# Patient Record
Sex: FEMALE | Race: ASIAN | NOT HISPANIC OR LATINO | Employment: OTHER | URBAN - METROPOLITAN AREA
[De-identification: names, ages, dates, MRNs, and addresses within clinical notes are randomized per-mention and may not be internally consistent; named-entity substitution may affect disease eponyms.]

---

## 2024-08-19 ENCOUNTER — VIRTUAL VISIT (OUTPATIENT)
Dept: INTERPRETER SERVICES | Facility: CLINIC | Age: 64
End: 2024-08-19

## 2024-08-19 ENCOUNTER — APPOINTMENT (OUTPATIENT)
Dept: PHYSICAL THERAPY | Facility: CLINIC | Age: 64
End: 2024-08-19
Attending: FAMILY MEDICINE
Payer: COMMERCIAL

## 2024-08-19 ENCOUNTER — HOSPITAL ENCOUNTER (INPATIENT)
Facility: CLINIC | Age: 64
LOS: 1 days | Discharge: HOME HEALTH | End: 2024-08-20
Attending: EMERGENCY MEDICINE | Admitting: FAMILY MEDICINE
Payer: COMMERCIAL

## 2024-08-19 ENCOUNTER — ORDERS ONLY (AUTO-RELEASED) (OUTPATIENT)
Dept: INTENSIVE CARE | Facility: CLINIC | Age: 64
End: 2024-08-19
Payer: COMMERCIAL

## 2024-08-19 ENCOUNTER — APPOINTMENT (OUTPATIENT)
Dept: MRI IMAGING | Facility: CLINIC | Age: 64
End: 2024-08-19
Attending: EMERGENCY MEDICINE
Payer: COMMERCIAL

## 2024-08-19 ENCOUNTER — APPOINTMENT (OUTPATIENT)
Dept: CARDIOLOGY | Facility: CLINIC | Age: 64
End: 2024-08-19
Attending: FAMILY MEDICINE
Payer: COMMERCIAL

## 2024-08-19 ENCOUNTER — APPOINTMENT (OUTPATIENT)
Dept: SPEECH THERAPY | Facility: CLINIC | Age: 64
End: 2024-08-19
Attending: FAMILY MEDICINE
Payer: COMMERCIAL

## 2024-08-19 DIAGNOSIS — E87.1 HYPONATREMIA: ICD-10-CM

## 2024-08-19 DIAGNOSIS — I63.9 CEREBROVASCULAR ACCIDENT (CVA), UNSPECIFIED MECHANISM (H): Primary | ICD-10-CM

## 2024-08-19 DIAGNOSIS — R41.89 COGNITIVE IMPAIRMENT: ICD-10-CM

## 2024-08-19 DIAGNOSIS — E83.42 HYPOMAGNESEMIA: ICD-10-CM

## 2024-08-19 DIAGNOSIS — I63.9 CEREBROVASCULAR ACCIDENT (CVA), UNSPECIFIED MECHANISM (H): ICD-10-CM

## 2024-08-19 DIAGNOSIS — R42 DISEQUILIBRIUM: ICD-10-CM

## 2024-08-19 DIAGNOSIS — I10 ESSENTIAL HYPERTENSION, BENIGN: ICD-10-CM

## 2024-08-19 DIAGNOSIS — R63.8 DIFFICULTY EATING: ICD-10-CM

## 2024-08-19 LAB
ALBUMIN UR-MCNC: NEGATIVE MG/DL
ANION GAP SERPL CALCULATED.3IONS-SCNC: 12 MMOL/L (ref 7–15)
ANION GAP SERPL CALCULATED.3IONS-SCNC: 16 MMOL/L (ref 7–15)
ANION GAP SERPL CALCULATED.3IONS-SCNC: 16 MMOL/L (ref 7–15)
APPEARANCE UR: CLEAR
ATRIAL RATE - MUSE: 69 BPM
BACTERIA #/AREA URNS HPF: ABNORMAL /HPF
BASOPHILS # BLD AUTO: 0 10E3/UL (ref 0–0.2)
BASOPHILS NFR BLD AUTO: 0 %
BILIRUB UR QL STRIP: NEGATIVE
BUN SERPL-MCNC: 18.9 MG/DL (ref 8–23)
BUN SERPL-MCNC: 20.6 MG/DL (ref 8–23)
BUN SERPL-MCNC: 23.6 MG/DL (ref 8–23)
CALCIUM SERPL-MCNC: 8.1 MG/DL (ref 8.8–10.4)
CALCIUM SERPL-MCNC: 8.6 MG/DL (ref 8.8–10.4)
CALCIUM SERPL-MCNC: 8.9 MG/DL (ref 8.8–10.4)
CHLORIDE SERPL-SCNC: 81 MMOL/L (ref 98–107)
CHLORIDE SERPL-SCNC: 84 MMOL/L (ref 98–107)
CHLORIDE SERPL-SCNC: 85 MMOL/L (ref 98–107)
CHOLEST SERPL-MCNC: 111 MG/DL
COLOR UR AUTO: COLORLESS
CORTIS SERPL-MCNC: 16 UG/DL
CREAT SERPL-MCNC: 1.41 MG/DL (ref 0.51–0.95)
CREAT SERPL-MCNC: 1.42 MG/DL (ref 0.51–0.95)
CREAT SERPL-MCNC: 1.72 MG/DL (ref 0.51–0.95)
DIASTOLIC BLOOD PRESSURE - MUSE: NORMAL MMHG
EGFRCR SERPLBLD CKD-EPI 2021: 33 ML/MIN/1.73M2
EGFRCR SERPLBLD CKD-EPI 2021: 41 ML/MIN/1.73M2
EGFRCR SERPLBLD CKD-EPI 2021: 42 ML/MIN/1.73M2
EOSINOPHIL # BLD AUTO: 0.2 10E3/UL (ref 0–0.7)
EOSINOPHIL NFR BLD AUTO: 2 %
ERYTHROCYTE [DISTWIDTH] IN BLOOD BY AUTOMATED COUNT: 12 % (ref 10–15)
GLUCOSE BLDC GLUCOMTR-MCNC: 216 MG/DL (ref 70–99)
GLUCOSE BLDC GLUCOMTR-MCNC: 227 MG/DL (ref 70–99)
GLUCOSE BLDC GLUCOMTR-MCNC: 238 MG/DL (ref 70–99)
GLUCOSE BLDC GLUCOMTR-MCNC: 73 MG/DL (ref 70–99)
GLUCOSE BLDC GLUCOMTR-MCNC: 76 MG/DL (ref 70–99)
GLUCOSE SERPL-MCNC: 153 MG/DL (ref 70–99)
GLUCOSE SERPL-MCNC: 68 MG/DL (ref 70–99)
GLUCOSE SERPL-MCNC: 85 MG/DL (ref 70–99)
GLUCOSE UR STRIP-MCNC: NEGATIVE MG/DL
HBA1C MFR BLD: 9.9 %
HCO3 SERPL-SCNC: 21 MMOL/L (ref 22–29)
HCO3 SERPL-SCNC: 23 MMOL/L (ref 22–29)
HCO3 SERPL-SCNC: 30 MMOL/L (ref 22–29)
HCT VFR BLD AUTO: 31.9 % (ref 35–47)
HDLC SERPL-MCNC: 34 MG/DL
HGB BLD-MCNC: 11.4 G/DL (ref 11.7–15.7)
HGB UR QL STRIP: NEGATIVE
IMM GRANULOCYTES # BLD: 0 10E3/UL
IMM GRANULOCYTES NFR BLD: 0 %
INTERPRETATION ECG - MUSE: NORMAL
KETONES UR STRIP-MCNC: NEGATIVE MG/DL
LDLC SERPL CALC-MCNC: 51 MG/DL
LEUKOCYTE ESTERASE UR QL STRIP: NEGATIVE
LVEF ECHO: NORMAL
LYMPHOCYTES # BLD AUTO: 2.9 10E3/UL (ref 0.8–5.3)
LYMPHOCYTES NFR BLD AUTO: 32 %
MAGNESIUM SERPL-MCNC: 1.6 MG/DL (ref 1.7–2.3)
MAGNESIUM SERPL-MCNC: 2.5 MG/DL (ref 1.7–2.3)
MCH RBC QN AUTO: 29.8 PG (ref 26.5–33)
MCHC RBC AUTO-ENTMCNC: 35.7 G/DL (ref 31.5–36.5)
MCV RBC AUTO: 83 FL (ref 78–100)
MONOCYTES # BLD AUTO: 0.7 10E3/UL (ref 0–1.3)
MONOCYTES NFR BLD AUTO: 8 %
MUCOUS THREADS #/AREA URNS LPF: PRESENT /LPF
NEUTROPHILS # BLD AUTO: 5.1 10E3/UL (ref 1.6–8.3)
NEUTROPHILS NFR BLD AUTO: 57 %
NITRATE UR QL: NEGATIVE
NONHDLC SERPL-MCNC: 77 MG/DL
NRBC # BLD AUTO: 0 10E3/UL
NRBC BLD AUTO-RTO: 0 /100
OSMOLALITY SERPL: 258 MMOL/KG (ref 280–301)
OSMOLALITY UR: 151 MMOL/KG (ref 100–1200)
P AXIS - MUSE: 45 DEGREES
PH UR STRIP: 5 [PH] (ref 5–7)
PLATELET # BLD AUTO: 165 10E3/UL (ref 150–450)
POTASSIUM SERPL-SCNC: 2.9 MMOL/L (ref 3.4–5.3)
POTASSIUM SERPL-SCNC: 2.9 MMOL/L (ref 3.4–5.3)
POTASSIUM SERPL-SCNC: 3.6 MMOL/L (ref 3.4–5.3)
POTASSIUM SERPL-SCNC: 4.5 MMOL/L (ref 3.4–5.3)
PR INTERVAL - MUSE: 216 MS
QRS DURATION - MUSE: 102 MS
QT - MUSE: 440 MS
QTC - MUSE: 471 MS
R AXIS - MUSE: 22 DEGREES
RBC # BLD AUTO: 3.83 10E6/UL (ref 3.8–5.2)
RBC URINE: <1 /HPF
SODIUM SERPL-SCNC: 122 MMOL/L (ref 135–145)
SODIUM SERPL-SCNC: 122 MMOL/L (ref 135–145)
SODIUM SERPL-SCNC: 123 MMOL/L (ref 135–145)
SODIUM SERPL-SCNC: 123 MMOL/L (ref 135–145)
SODIUM SERPL-SCNC: 126 MMOL/L (ref 135–145)
SODIUM UR-SCNC: 29 MMOL/L
SP GR UR STRIP: 1 (ref 1–1.03)
SQUAMOUS EPITHELIAL: <1 /HPF
SYSTOLIC BLOOD PRESSURE - MUSE: NORMAL MMHG
T AXIS - MUSE: 78 DEGREES
T4 FREE SERPL-MCNC: 1.52 NG/DL (ref 0.9–1.7)
TRIGL SERPL-MCNC: 132 MG/DL
TROPONIN T SERPL HS-MCNC: 16 NG/L
TROPONIN T SERPL HS-MCNC: 17 NG/L
TROPONIN T SERPL HS-MCNC: 23 NG/L
TROPONIN T SERPL HS-MCNC: 29 NG/L
TSH SERPL DL<=0.005 MIU/L-ACNC: 4.8 UIU/ML (ref 0.3–4.2)
UROBILINOGEN UR STRIP-MCNC: <2 MG/DL
VENTRICULAR RATE- MUSE: 69 BPM
WBC # BLD AUTO: 8.9 10E3/UL (ref 4–11)
WBC URINE: 1 /HPF

## 2024-08-19 PROCEDURE — 250N000011 HC RX IP 250 OP 636: Performed by: INTERNAL MEDICINE

## 2024-08-19 PROCEDURE — T1013 SIGN LANG/ORAL INTERPRETER: HCPCS | Mod: U4,TEL,95 | Performed by: INTERPRETER

## 2024-08-19 PROCEDURE — 99207 PR APP CREDIT; MD BILLING SHARED VISIT: CPT | Performed by: INTERNAL MEDICINE

## 2024-08-19 PROCEDURE — 83735 ASSAY OF MAGNESIUM: CPT | Performed by: FAMILY MEDICINE

## 2024-08-19 PROCEDURE — 84439 ASSAY OF FREE THYROXINE: CPT | Performed by: FAMILY MEDICINE

## 2024-08-19 PROCEDURE — 84132 ASSAY OF SERUM POTASSIUM: CPT | Performed by: INTERNAL MEDICINE

## 2024-08-19 PROCEDURE — 255N000002 HC RX 255 OP 636: Performed by: EMERGENCY MEDICINE

## 2024-08-19 PROCEDURE — 82565 ASSAY OF CREATININE: CPT | Performed by: FAMILY MEDICINE

## 2024-08-19 PROCEDURE — 258N000003 HC RX IP 258 OP 636: Performed by: EMERGENCY MEDICINE

## 2024-08-19 PROCEDURE — 82962 GLUCOSE BLOOD TEST: CPT

## 2024-08-19 PROCEDURE — 36415 COLL VENOUS BLD VENIPUNCTURE: CPT | Performed by: INTERNAL MEDICINE

## 2024-08-19 PROCEDURE — 92610 EVALUATE SWALLOWING FUNCTION: CPT | Mod: GN

## 2024-08-19 PROCEDURE — 250N000013 HC RX MED GY IP 250 OP 250 PS 637: Performed by: EMERGENCY MEDICINE

## 2024-08-19 PROCEDURE — 83935 ASSAY OF URINE OSMOLALITY: CPT | Performed by: FAMILY MEDICINE

## 2024-08-19 PROCEDURE — 84443 ASSAY THYROID STIM HORMONE: CPT | Performed by: FAMILY MEDICINE

## 2024-08-19 PROCEDURE — 93005 ELECTROCARDIOGRAM TRACING: CPT | Performed by: EMERGENCY MEDICINE

## 2024-08-19 PROCEDURE — 96365 THER/PROPH/DIAG IV INF INIT: CPT | Mod: 59

## 2024-08-19 PROCEDURE — 83036 HEMOGLOBIN GLYCOSYLATED A1C: CPT | Performed by: FAMILY MEDICINE

## 2024-08-19 PROCEDURE — 999N000111 HC STATISTIC OT IP EVAL DEFER

## 2024-08-19 PROCEDURE — 80048 BASIC METABOLIC PNL TOTAL CA: CPT | Performed by: EMERGENCY MEDICINE

## 2024-08-19 PROCEDURE — 70549 MR ANGIOGRAPH NECK W/O&W/DYE: CPT

## 2024-08-19 PROCEDURE — 99285 EMERGENCY DEPT VISIT HI MDM: CPT | Mod: 25

## 2024-08-19 PROCEDURE — 120N000004 HC R&B MS OVERFLOW

## 2024-08-19 PROCEDURE — 80048 BASIC METABOLIC PNL TOTAL CA: CPT | Performed by: FAMILY MEDICINE

## 2024-08-19 PROCEDURE — 93306 TTE W/DOPPLER COMPLETE: CPT | Mod: 26 | Performed by: INTERNAL MEDICINE

## 2024-08-19 PROCEDURE — 97116 GAIT TRAINING THERAPY: CPT | Mod: GP

## 2024-08-19 PROCEDURE — 82533 TOTAL CORTISOL: CPT | Performed by: FAMILY MEDICINE

## 2024-08-19 PROCEDURE — 84300 ASSAY OF URINE SODIUM: CPT | Performed by: FAMILY MEDICINE

## 2024-08-19 PROCEDURE — 70553 MRI BRAIN STEM W/O & W/DYE: CPT

## 2024-08-19 PROCEDURE — 97161 PT EVAL LOW COMPLEX 20 MIN: CPT | Mod: GP

## 2024-08-19 PROCEDURE — 36415 COLL VENOUS BLD VENIPUNCTURE: CPT | Performed by: FAMILY MEDICINE

## 2024-08-19 PROCEDURE — 85025 COMPLETE CBC W/AUTO DIFF WBC: CPT | Performed by: EMERGENCY MEDICINE

## 2024-08-19 PROCEDURE — 250N000013 HC RX MED GY IP 250 OP 250 PS 637: Performed by: FAMILY MEDICINE

## 2024-08-19 PROCEDURE — 83718 ASSAY OF LIPOPROTEIN: CPT | Performed by: FAMILY MEDICINE

## 2024-08-19 PROCEDURE — 83930 ASSAY OF BLOOD OSMOLALITY: CPT | Performed by: FAMILY MEDICINE

## 2024-08-19 PROCEDURE — 36415 COLL VENOUS BLD VENIPUNCTURE: CPT | Performed by: EMERGENCY MEDICINE

## 2024-08-19 PROCEDURE — 83735 ASSAY OF MAGNESIUM: CPT | Performed by: EMERGENCY MEDICINE

## 2024-08-19 PROCEDURE — 97110 THERAPEUTIC EXERCISES: CPT | Mod: GP

## 2024-08-19 PROCEDURE — 258N000003 HC RX IP 258 OP 636: Performed by: FAMILY MEDICINE

## 2024-08-19 PROCEDURE — 250N000011 HC RX IP 250 OP 636: Performed by: EMERGENCY MEDICINE

## 2024-08-19 PROCEDURE — 82374 ASSAY BLOOD CARBON DIOXIDE: CPT | Performed by: FAMILY MEDICINE

## 2024-08-19 PROCEDURE — 250N000013 HC RX MED GY IP 250 OP 250 PS 637: Performed by: PHYSICIAN ASSISTANT

## 2024-08-19 PROCEDURE — 255N000002 HC RX 255 OP 636: Performed by: INTERNAL MEDICINE

## 2024-08-19 PROCEDURE — 70544 MR ANGIOGRAPHY HEAD W/O DYE: CPT

## 2024-08-19 PROCEDURE — 99222 1ST HOSP IP/OBS MODERATE 55: CPT | Performed by: FAMILY MEDICINE

## 2024-08-19 PROCEDURE — A9585 GADOBUTROL INJECTION: HCPCS | Performed by: EMERGENCY MEDICINE

## 2024-08-19 PROCEDURE — 96375 TX/PRO/DX INJ NEW DRUG ADDON: CPT

## 2024-08-19 PROCEDURE — 84295 ASSAY OF SERUM SODIUM: CPT | Performed by: INTERNAL MEDICINE

## 2024-08-19 PROCEDURE — 96366 THER/PROPH/DIAG IV INF ADDON: CPT

## 2024-08-19 PROCEDURE — 84484 ASSAY OF TROPONIN QUANT: CPT | Performed by: FAMILY MEDICINE

## 2024-08-19 PROCEDURE — 84484 ASSAY OF TROPONIN QUANT: CPT | Performed by: EMERGENCY MEDICINE

## 2024-08-19 PROCEDURE — 250N000013 HC RX MED GY IP 250 OP 250 PS 637: Performed by: INTERNAL MEDICINE

## 2024-08-19 PROCEDURE — 81001 URINALYSIS AUTO W/SCOPE: CPT | Performed by: EMERGENCY MEDICINE

## 2024-08-19 RX ORDER — LIDOCAINE 40 MG/G
CREAM TOPICAL
Status: DISCONTINUED | OUTPATIENT
Start: 2024-08-19 | End: 2024-08-20 | Stop reason: HOSPADM

## 2024-08-19 RX ORDER — ASPIRIN 300 MG/1
300 SUPPOSITORY RECTAL DAILY
Status: DISCONTINUED | OUTPATIENT
Start: 2024-08-19 | End: 2024-08-19

## 2024-08-19 RX ORDER — NICOTINE POLACRILEX 4 MG
15-30 LOZENGE BUCCAL
Status: DISCONTINUED | OUTPATIENT
Start: 2024-08-19 | End: 2024-08-20 | Stop reason: HOSPADM

## 2024-08-19 RX ORDER — FAMOTIDINE 20 MG/1
20 TABLET, FILM COATED ORAL 2 TIMES DAILY
Status: DISCONTINUED | OUTPATIENT
Start: 2024-08-19 | End: 2024-08-20 | Stop reason: HOSPADM

## 2024-08-19 RX ORDER — MAGNESIUM SULFATE HEPTAHYDRATE 40 MG/ML
2 INJECTION, SOLUTION INTRAVENOUS ONCE
Status: COMPLETED | OUTPATIENT
Start: 2024-08-19 | End: 2024-08-19

## 2024-08-19 RX ORDER — SODIUM CHLORIDE 9 MG/ML
INJECTION, SOLUTION INTRAVENOUS CONTINUOUS
Status: DISCONTINUED | OUTPATIENT
Start: 2024-08-19 | End: 2024-08-20

## 2024-08-19 RX ORDER — CLOPIDOGREL BISULFATE 75 MG/1
300 TABLET ORAL ONCE
Status: DISCONTINUED | OUTPATIENT
Start: 2024-08-19 | End: 2024-08-19

## 2024-08-19 RX ORDER — HYDROMORPHONE HCL IN WATER/PF 6 MG/30 ML
0.2 PATIENT CONTROLLED ANALGESIA SYRINGE INTRAVENOUS
Status: COMPLETED | OUTPATIENT
Start: 2024-08-19 | End: 2024-08-19

## 2024-08-19 RX ORDER — ASPIRIN 81 MG/1
81 TABLET ORAL DAILY
Status: DISCONTINUED | OUTPATIENT
Start: 2024-08-20 | End: 2024-08-19

## 2024-08-19 RX ORDER — GADOBUTROL 604.72 MG/ML
5 INJECTION INTRAVENOUS ONCE
Status: COMPLETED | OUTPATIENT
Start: 2024-08-19 | End: 2024-08-19

## 2024-08-19 RX ORDER — ASPIRIN 81 MG/1
81 TABLET ORAL DAILY
Status: DISCONTINUED | OUTPATIENT
Start: 2024-08-20 | End: 2024-08-20 | Stop reason: HOSPADM

## 2024-08-19 RX ORDER — MECLIZINE HCL 12.5 MG 12.5 MG/1
12.5 TABLET ORAL ONCE
Status: COMPLETED | OUTPATIENT
Start: 2024-08-19 | End: 2024-08-19

## 2024-08-19 RX ORDER — ASPIRIN 325 MG
325 TABLET, DELAYED RELEASE (ENTERIC COATED) ORAL DAILY
Status: DISCONTINUED | OUTPATIENT
Start: 2024-08-19 | End: 2024-08-19

## 2024-08-19 RX ORDER — ASPIRIN 300 MG/1
300 SUPPOSITORY RECTAL ONCE
Status: COMPLETED | OUTPATIENT
Start: 2024-08-19 | End: 2024-08-19

## 2024-08-19 RX ORDER — ATORVASTATIN CALCIUM 40 MG/1
40 TABLET, FILM COATED ORAL DAILY
COMMUNITY
Start: 2024-07-12

## 2024-08-19 RX ORDER — CLOPIDOGREL BISULFATE 75 MG/1
75 TABLET ORAL DAILY
Status: DISCONTINUED | OUTPATIENT
Start: 2024-08-20 | End: 2024-08-19

## 2024-08-19 RX ORDER — DEXTROSE MONOHYDRATE 25 G/50ML
25-50 INJECTION, SOLUTION INTRAVENOUS
Status: DISCONTINUED | OUTPATIENT
Start: 2024-08-19 | End: 2024-08-20 | Stop reason: HOSPADM

## 2024-08-19 RX ORDER — ASPIRIN 81 MG/1
81 TABLET ORAL DAILY
COMMUNITY
Start: 2024-07-12

## 2024-08-19 RX ORDER — CARBOXYMETHYLCELLULOSE SODIUM AND GLYCERIN 5; 9 MG/ML; MG/ML
1 SOLUTION/ DROPS OPHTHALMIC 3 TIMES DAILY PRN
COMMUNITY
Start: 2024-07-31

## 2024-08-19 RX ORDER — ACETAMINOPHEN 650 MG/1
650 SUPPOSITORY RECTAL EVERY 4 HOURS PRN
Status: DISCONTINUED | OUTPATIENT
Start: 2024-08-19 | End: 2024-08-20 | Stop reason: HOSPADM

## 2024-08-19 RX ORDER — ONDANSETRON 2 MG/ML
4 INJECTION INTRAMUSCULAR; INTRAVENOUS EVERY 6 HOURS PRN
Status: DISCONTINUED | OUTPATIENT
Start: 2024-08-19 | End: 2024-08-20 | Stop reason: HOSPADM

## 2024-08-19 RX ORDER — POTASSIUM CHLORIDE 7.45 MG/ML
10 INJECTION INTRAVENOUS
Status: DISCONTINUED | OUTPATIENT
Start: 2024-08-19 | End: 2024-08-19

## 2024-08-19 RX ORDER — ONDANSETRON 2 MG/ML
4 INJECTION INTRAMUSCULAR; INTRAVENOUS EVERY 30 MIN PRN
Status: DISCONTINUED | OUTPATIENT
Start: 2024-08-19 | End: 2024-08-20 | Stop reason: HOSPADM

## 2024-08-19 RX ORDER — ONDANSETRON 4 MG/1
4 TABLET, ORALLY DISINTEGRATING ORAL EVERY 6 HOURS PRN
Status: DISCONTINUED | OUTPATIENT
Start: 2024-08-19 | End: 2024-08-20 | Stop reason: HOSPADM

## 2024-08-19 RX ORDER — AMOXICILLIN 250 MG
2 CAPSULE ORAL 2 TIMES DAILY PRN
Status: DISCONTINUED | OUTPATIENT
Start: 2024-08-19 | End: 2024-08-20 | Stop reason: HOSPADM

## 2024-08-19 RX ORDER — AMOXICILLIN 250 MG
1 CAPSULE ORAL 2 TIMES DAILY PRN
Status: DISCONTINUED | OUTPATIENT
Start: 2024-08-19 | End: 2024-08-20 | Stop reason: HOSPADM

## 2024-08-19 RX ORDER — ACETAMINOPHEN 325 MG/1
650 TABLET ORAL EVERY 4 HOURS PRN
Status: DISCONTINUED | OUTPATIENT
Start: 2024-08-19 | End: 2024-08-20 | Stop reason: HOSPADM

## 2024-08-19 RX ORDER — ATORVASTATIN CALCIUM 40 MG/1
40 TABLET, FILM COATED ORAL EVERY EVENING
Status: DISCONTINUED | OUTPATIENT
Start: 2024-08-19 | End: 2024-08-20 | Stop reason: HOSPADM

## 2024-08-19 RX ORDER — POTASSIUM CHLORIDE 1500 MG/1
40 TABLET, EXTENDED RELEASE ORAL ONCE
Status: COMPLETED | OUTPATIENT
Start: 2024-08-19 | End: 2024-08-19

## 2024-08-19 RX ORDER — ROSUVASTATIN CALCIUM 5 MG/1
20 TABLET, COATED ORAL DAILY
Status: DISCONTINUED | OUTPATIENT
Start: 2024-08-19 | End: 2024-08-19

## 2024-08-19 RX ORDER — ASPIRIN 81 MG/1
324 TABLET, CHEWABLE ORAL DAILY
Status: DISCONTINUED | OUTPATIENT
Start: 2024-08-19 | End: 2024-08-19

## 2024-08-19 RX ORDER — LOSARTAN POTASSIUM AND HYDROCHLOROTHIAZIDE 12.5; 5 MG/1; MG/1
1 TABLET ORAL DAILY
Status: ON HOLD | COMMUNITY
Start: 2024-07-12 | End: 2024-08-20

## 2024-08-19 RX ORDER — ASPIRIN 300 MG/1
81 SUPPOSITORY RECTAL DAILY
Status: DISCONTINUED | OUTPATIENT
Start: 2024-08-20 | End: 2024-08-20 | Stop reason: HOSPADM

## 2024-08-19 RX ADMIN — POTASSIUM CHLORIDE 10 MEQ: 7.46 INJECTION, SOLUTION INTRAVENOUS at 09:34

## 2024-08-19 RX ADMIN — MAGNESIUM SULFATE HEPTAHYDRATE 2 G: 40 INJECTION, SOLUTION INTRAVENOUS at 02:15

## 2024-08-19 RX ADMIN — HYDROMORPHONE HYDROCHLORIDE 0.2 MG: 0.2 INJECTION, SOLUTION INTRAMUSCULAR; INTRAVENOUS; SUBCUTANEOUS at 05:25

## 2024-08-19 RX ADMIN — POTASSIUM CHLORIDE 40 MEQ: 1500 TABLET, EXTENDED RELEASE ORAL at 13:04

## 2024-08-19 RX ADMIN — SODIUM CHLORIDE 500 ML: 9 INJECTION, SOLUTION INTRAVENOUS at 02:13

## 2024-08-19 RX ADMIN — ASPIRIN 300 MG: 300 SUPPOSITORY RECTAL at 10:48

## 2024-08-19 RX ADMIN — GADOBUTROL 5 ML: 604.72 INJECTION INTRAVENOUS at 03:35

## 2024-08-19 RX ADMIN — FAMOTIDINE 20 MG: 10 INJECTION, SOLUTION INTRAVENOUS at 04:12

## 2024-08-19 RX ADMIN — FAMOTIDINE 20 MG: 20 TABLET, FILM COATED ORAL at 21:22

## 2024-08-19 RX ADMIN — ACETAMINOPHEN 650 MG: 325 TABLET ORAL at 18:29

## 2024-08-19 RX ADMIN — PERFLUTREN 2 ML: 6.52 INJECTION, SUSPENSION INTRAVENOUS at 09:14

## 2024-08-19 RX ADMIN — ATORVASTATIN CALCIUM 40 MG: 40 TABLET, FILM COATED ORAL at 21:22

## 2024-08-19 RX ADMIN — POTASSIUM CHLORIDE 10 MEQ: 7.46 INJECTION, SOLUTION INTRAVENOUS at 10:28

## 2024-08-19 RX ADMIN — ONDANSETRON 4 MG: 2 INJECTION INTRAMUSCULAR; INTRAVENOUS at 01:06

## 2024-08-19 RX ADMIN — MECLIZINE HYDROCHLORIDE 12.5 MG: 12.5 TABLET ORAL at 01:19

## 2024-08-19 RX ADMIN — SODIUM CHLORIDE: 9 INJECTION, SOLUTION INTRAVENOUS at 09:35

## 2024-08-19 ASSESSMENT — ACTIVITIES OF DAILY LIVING (ADL)
ADLS_ACUITY_SCORE: 53
ADLS_ACUITY_SCORE: 40
ADLS_ACUITY_SCORE: 43
ADLS_ACUITY_SCORE: 43
ADLS_ACUITY_SCORE: 35
ADLS_ACUITY_SCORE: 41
DEPENDENT_IADLS:: CLEANING;COOKING;LAUNDRY;SHOPPING;MEAL PREPARATION;MEDICATION MANAGEMENT;MONEY MANAGEMENT;TRANSPORTATION
ADLS_ACUITY_SCORE: 43
ADLS_ACUITY_SCORE: 43
ADLS_ACUITY_SCORE: 35
ADLS_ACUITY_SCORE: 33
ADLS_ACUITY_SCORE: 35
ADLS_ACUITY_SCORE: 35
ADLS_ACUITY_SCORE: 39
ADLS_ACUITY_SCORE: 41
ADLS_ACUITY_SCORE: 35
ADLS_ACUITY_SCORE: 40
ADLS_ACUITY_SCORE: 43
ADLS_ACUITY_SCORE: 41

## 2024-08-19 ASSESSMENT — ENCOUNTER SYMPTOMS
DIARRHEA: 1
DIZZINESS: 1
APPETITE CHANGE: 0
HEADACHES: 1
NAUSEA: 1

## 2024-08-19 ASSESSMENT — COLUMBIA-SUICIDE SEVERITY RATING SCALE - C-SSRS
6. HAVE YOU EVER DONE ANYTHING, STARTED TO DO ANYTHING, OR PREPARED TO DO ANYTHING TO END YOUR LIFE?: NO
2. HAVE YOU ACTUALLY HAD ANY THOUGHTS OF KILLING YOURSELF IN THE PAST MONTH?: NO
1. IN THE PAST MONTH, HAVE YOU WISHED YOU WERE DEAD OR WISHED YOU COULD GO TO SLEEP AND NOT WAKE UP?: NO

## 2024-08-19 NOTE — PROGRESS NOTES
[facility]    Medicine Progress Note - Hospitalist Service    Date of Admission:  8/19/2024    Assessment & Plan    Milady Pollock is a 64 year old  female with history of DM2, essential hypertension, dyslipidemia, history of CVA, dementia, GERD, came with confusion, headache, difficulty walking for 2 days found to have moderate dehydration, acute kidney injury with creatinine 1.72, hyponatremia with sodium 123 and acute/subacute right temporal lobe infarct on MRI scan.  Stroke neurology is involved with care.      Acute ischemic DVA  History of CVA  MRI-acute/subacute right temporal lobe infarct without hemorrhagic conversion  Initiate stroke protocol  - Order  mg rectal now.   - Aspirin 81 mg daily indefinitely  - Crestor 20 mg daily  - Telemetry monitoring  - Echocardiogram in process.   - PT and OT evaluation  - Speech evaluation  - Stroke neurology consultation     Acute hyponatremia   Moderate dehydration  Acute kidney injury with creatinine 1.72  Status post 500 mL normal saline bolus in ED  Urine osm 151, serum osm 258.    TSH is elevated 4.8 but free T4 is normal.  Cortisol pending.  - Continue IV normal saline at 100 mL/h  - Free water restriction 1200 mL daily.  - sodium level q6h.        Hypomagnesemia   Hypokalemia  Status post 2 g IV magnesium in ED  - Order magnesium and potassium replacement protocols.      Hypertensive urgency  Essential hypertension  Permissive hypertension. SBP <180 mmHg.   Antihypertensives are on hold due to acute stroke     DM2, hemoglobin A1c 9.3  Diabetic diet and insulin sliding scale  Glipizide 5 mg daily  Linagliptin 5 mg daily     Dementia  Under 5 mg twice a day  Continue supportive care     Code full  DVT prophylaxis  Started on aspirin and Plavix per stroke order  Early ambulation and SCDs     Inpatient admission  Needs to stay in the hospital at least for 2 days for further evaluation and treatment        Diet:  NPO, pending speech  evaluation  DVT Prophylaxis: plavix, ASA, SCDs.  Mar[place]aga Catheter: Not present  Lines: None     Cardiac Monitoring: ACTIVE order. Indication: Stroke, acute (48 hours)  Code Status: Full Code      Clinically Significant Risk Factors Present on Admission        # Hypokalemia: Lowest K = 2.9 mmol/L in last 2 days, will replace as needed  # Hyponatremia: Lowest Na = 123 mmol/L in last 2 days, will monitor as appropriate    # Hypomagnesemia: Lowest Mg = 1.6 mg/dL in last 2 days, will replace as needed              # DMII: A1C = 9.9 % (Ref range: <5.7 %) within past 6 months                     Disposition Plan     Medically Ready for Discharge: Anticipated in 2-4 Days             Segundo Cabello DO  Hospitalist Service  [facility]  Securely message with [employer] (more info)  Text page via [employer] Paging/Directory   ______________________________________________________________________    Interval History   No new complaints.      Physical Exam   Vital Signs: Temp: 97.3  F (36.3  C) Temp src: Oral BP: (!) 159/77 Pulse: 72   Resp: 16 SpO2: 96 %      Weight: 111 lbs 0 oz    GENERAL:  Alert, appears comfortable, in no acute distress, looks older than stated age   HEAD:  Normocephalic, without obvious abnormality, atraumatic   EYES:  PERRL, conjunctiva  clear,  EOM's intact   THROAT: Lips, mucosa, gums normal, mouth moist   NECK: Supple, symmetrical, trachea midline   LUNGS:   Clear to auscultation bilaterally, no rales, rhonchi, or wheezing, symmetric chest rise on inhalation, respirations unlabored   HEART:  Regular rate and rhythm, S1 and S2 normal, no murmur, rub, or gallop    ABDOMEN:   Soft, non-tender, bowel sounds active, no masses, no organomegaly, no rebound or guarding   EXTREMITIES: No  edema    SKIN: Dry to touch    NEURO: Alert, not completely oriented x 4, moves all four extremities freely, and lower extremity strength intact, mild left-sided facial droop   PSYCH: Cooperative, behavior is  appropriate        Medical Decision Making       40 MINUTES SPENT BY ME on the date of service doing chart review, history, exam, documentation & further activities per the note.      Data     I have personally reviewed the following data over the past 24 hrs:    8.9  \   11.4 (L)   / 165     123 (L) 84 (L) 18.9 /  85   2.9 (L) 23 1.42 (H) \     Trop: 23 (H) BNP: N/A     TSH: 4.80 (H) T4: 1.52 A1C: 9.9 (H)       Imaging results reviewed over the past 24 hrs:   Recent Results (from the past 24 hour(s))   MR Brain w/o & w Contrast    Narrative    EXAM: MR BRAIN W/O and W CONTRAST, MRA NECK (CAROTIDS) W/O and W CONTRAST, MRA BRAIN ([place]) W/O CONTRAST  LOCATION: [facility]  DATE: 8/19/2024    INDICATION: Two days of disequilibrium and headache, history of dementia.  COMPARISON: None.  CONTRAST: 5 mL Gadavist  TECHNIQUE:   1) Routine multiplanar multisequence head MRI without and with intravenous contrast.  2) 3D time-of-flight head MRA without intravenous contrast.  3) Neck MRA without and with IV contrast. Stenosis measurements made according to NASCET criteria unless otherwise specified.    FINDINGS:  HEAD MRI:  INTRACRANIAL CONTENTS: There is a small curvilinear focus of diffusion restriction identified within the right temporal lobe measuring approximately 1.2 x 0.5 cm in greatest axial dimensions. There is no associated hemorrhagic conversion, however there   is mild associated T2/FLAIR signal hyperintensity in this region. No mass, acute hemorrhage or abnormal extra-axial fluid collection. There are numerous scattered foci of susceptibility artifact within the supratentorial brain in a central and peripheral   distribution, likely reflecting chronic microhemorrhage (such as in the setting of chronic hypertension) versus amyloid angiopathy. Patchy and confluent nonspecific T2/FLAIR hyperintensities within the cerebral white matter most consistent with moderate   chronic  microvascular ischemic change. Mild generalized cerebral atrophy. No hydrocephalus. Normal position of the cerebellar tonsils. No pathologic contrast enhancement.    SELLA: No abnormality accounting for technique.    OSSEOUS STRUCTURES/SOFT TISSUES: Normal marrow signal. The major intracranial vascular flow voids are maintained.     ORBITS: Prior left cataract surgery. Visualized portions of the orbits are otherwise unremarkable.     SINUSES/MASTOIDS: Mild mucosal thickening scattered about the paranasal sinuses. No middle ear or mastoid effusion.     HEAD MRA:   ANTERIOR CIRCULATION: No stenosis/occlusion, aneurysm, or high flow vascular malformation. Standard [place] anatomy.    POSTERIOR CIRCULATION: No stenosis/occlusion, aneurysm, or high flow vascular malformation. Dominant left and smaller right vertebral artery contribute to a normal basilar artery.     NECK MRA:   RIGHT CAROTID: No measurable stenosis or dissection.    LEFT CAROTID: No measurable stenosis or dissection.    VERTEBRAL ARTERIES: No focal stenosis or dissection. Dominant left and smaller right vertebral arteries.    AORTIC ARCH: Classic aortic arch anatomy with no significant stenosis at the origin of the great vessels.      Impression    IMPRESSION:  HEAD MRI:   1.  Acute/subacute right temporal lobe infarct without hemorrhagic conversion.  2.  Chronic senescent and presumed microvascular ischemic changes, as above.  3.  Scattered foci of susceptibility artifact within the supratentorial brain within peripheral and central distributions, likely reflecting chronic microhemorrhage versus amyloid angiopathy (or a combination of both).    HEAD MRA:   1.  No significant stenosis, large vessel occlusion, aneurysm or high flow vascular malformation.    NECK MRA:  1.  No significant stenosis or dissection.            Dr. Giancarlo Chavez discussed results with Dr. Mclean on 8/19/2024 at 4:36 AM CDT.     MRA Angiogram Head w/o Contrast     Narrative    EXAM: MR BRAIN W/O and W CONTRAST, MRA NECK (CAROTIDS) W/O and W CONTRAST, MRA BRAIN ([place]) W/O CONTRAST  LOCATION: [facility]  DATE: 8/19/2024    INDICATION: Two days of disequilibrium and headache, history of dementia.  COMPARISON: None.  CONTRAST: 5 mL Gadavist  TECHNIQUE:   1) Routine multiplanar multisequence head MRI without and with intravenous contrast.  2) 3D time-of-flight head MRA without intravenous contrast.  3) Neck MRA without and with IV contrast. Stenosis measurements made according to NASCET criteria unless otherwise specified.    FINDINGS:  HEAD MRI:  INTRACRANIAL CONTENTS: There is a small curvilinear focus of diffusion restriction identified within the right temporal lobe measuring approximately 1.2 x 0.5 cm in greatest axial dimensions. There is no associated hemorrhagic conversion, however there   is mild associated T2/FLAIR signal hyperintensity in this region. No mass, acute hemorrhage or abnormal extra-axial fluid collection. There are numerous scattered foci of susceptibility artifact within the supratentorial brain in a central and peripheral   distribution, likely reflecting chronic microhemorrhage (such as in the setting of chronic hypertension) versus amyloid angiopathy. Patchy and confluent nonspecific T2/FLAIR hyperintensities within the cerebral white matter most consistent with moderate   chronic microvascular ischemic change. Mild generalized cerebral atrophy. No hydrocephalus. Normal position of the cerebellar tonsils. No pathologic contrast enhancement.    SELLA: No abnormality accounting for technique.    OSSEOUS STRUCTURES/SOFT TISSUES: Normal marrow signal. The major intracranial vascular flow voids are maintained.     ORBITS: Prior left cataract surgery. Visualized portions of the orbits are otherwise unremarkable.     SINUSES/MASTOIDS: Mild mucosal thickening scattered about the paranasal sinuses. No middle ear or mastoid  effusion.     HEAD MRA:   ANTERIOR CIRCULATION: No stenosis/occlusion, aneurysm, or high flow vascular malformation. Standard [place] anatomy.    POSTERIOR CIRCULATION: No stenosis/occlusion, aneurysm, or high flow vascular malformation. Dominant left and smaller right vertebral artery contribute to a normal basilar artery.     NECK MRA:   RIGHT CAROTID: No measurable stenosis or dissection.    LEFT CAROTID: No measurable stenosis or dissection.    VERTEBRAL ARTERIES: No focal stenosis or dissection. Dominant left and smaller right vertebral arteries.    AORTIC ARCH: Classic aortic arch anatomy with no significant stenosis at the origin of the great vessels.      Impression    IMPRESSION:  HEAD MRI:   1.  Acute/subacute right temporal lobe infarct without hemorrhagic conversion.  2.  Chronic senescent and presumed microvascular ischemic changes, as above.  3.  Scattered foci of susceptibility artifact within the supratentorial brain within peripheral and central distributions, likely reflecting chronic microhemorrhage versus amyloid angiopathy (or a combination of both).    HEAD MRA:   1.  No significant stenosis, large vessel occlusion, aneurysm or high flow vascular malformation.    NECK MRA:  1.  No significant stenosis or dissection.            Dr. Giancarlo Chavez discussed results with Dr. Mclean on 8/19/2024 at 4:36 AM CDT.     MRA Angiogram Neck w/o & w Contrast    Narrative    EXAM: MR BRAIN W/O and W CONTRAST, MRA NECK (CAROTIDS) W/O and W CONTRAST, MRA BRAIN ([place]) W/O CONTRAST  LOCATION: [facility]  DATE: 8/19/2024    INDICATION: Two days of disequilibrium and headache, history of dementia.  COMPARISON: None.  CONTRAST: 5 mL Gadavist  TECHNIQUE:   1) Routine multiplanar multisequence head MRI without and with intravenous contrast.  2) 3D time-of-flight head MRA without intravenous contrast.  3) Neck MRA without and with IV contrast. Stenosis measurements made  according to NASCET criteria unless otherwise specified.    FINDINGS:  HEAD MRI:  INTRACRANIAL CONTENTS: There is a small curvilinear focus of diffusion restriction identified within the right temporal lobe measuring approximately 1.2 x 0.5 cm in greatest axial dimensions. There is no associated hemorrhagic conversion, however there   is mild associated T2/FLAIR signal hyperintensity in this region. No mass, acute hemorrhage or abnormal extra-axial fluid collection. There are numerous scattered foci of susceptibility artifact within the supratentorial brain in a central and peripheral   distribution, likely reflecting chronic microhemorrhage (such as in the setting of chronic hypertension) versus amyloid angiopathy. Patchy and confluent nonspecific T2/FLAIR hyperintensities within the cerebral white matter most consistent with moderate   chronic microvascular ischemic change. Mild generalized cerebral atrophy. No hydrocephalus. Normal position of the cerebellar tonsils. No pathologic contrast enhancement.    SELLA: No abnormality accounting for technique.    OSSEOUS STRUCTURES/SOFT TISSUES: Normal marrow signal. The major intracranial vascular flow voids are maintained.     ORBITS: Prior left cataract surgery. Visualized portions of the orbits are otherwise unremarkable.     SINUSES/MASTOIDS: Mild mucosal thickening scattered about the paranasal sinuses. No middle ear or mastoid effusion.     HEAD MRA:   ANTERIOR CIRCULATION: No stenosis/occlusion, aneurysm, or high flow vascular malformation. Standard [place] anatomy.    POSTERIOR CIRCULATION: No stenosis/occlusion, aneurysm, or high flow vascular malformation. Dominant left and smaller right vertebral artery contribute to a normal basilar artery.     NECK MRA:   RIGHT CAROTID: No measurable stenosis or dissection.    LEFT CAROTID: No measurable stenosis or dissection.    VERTEBRAL ARTERIES: No focal stenosis or dissection. Dominant left and smaller right  vertebral arteries.    AORTIC ARCH: Classic aortic arch anatomy with no significant stenosis at the origin of the great vessels.      Impression    IMPRESSION:  HEAD MRI:   1.  Acute/subacute right temporal lobe infarct without hemorrhagic conversion.  2.  Chronic senescent and presumed microvascular ischemic changes, as above.  3.  Scattered foci of susceptibility artifact within the supratentorial brain within peripheral and central distributions, likely reflecting chronic microhemorrhage versus amyloid angiopathy (or a combination of both).    HEAD MRA:   1.  No significant stenosis, large vessel occlusion, aneurysm or high flow vascular malformation.    NECK MRA:  1.  No significant stenosis or dissection.            Dr. Giancarlo Chavez discussed results with Dr. Mclean on 8/19/2024 at 4:36 AM CDT.

## 2024-08-19 NOTE — CONSULTS
[facility]    Stroke Consult Note    Reason for Consult:  stroke    Chief Complaint: Headache       HPI  Milady Pollock is a 64 year old female with PMH HTN, HLD, DM2, prior stroke, cognitive impairment. Presented 8/19 with headache, dizziness, nausea, weakness. Patient moved to [place] from [place] in January where she previously lived with her son. Daughter present today says she believes patient was last independent of ADLs ~8 years ago and has had more significant progressive cognitive impairment for the past 2 years. After coming to [place] she had not established a PCP so ran out of medications in May and went ~2 months without. She did see a PCP in July and was prescribed medications which family has been giving though feel patient is having side effects of dizziness, headaches and weakness with the medications. Daughter states patient has had weakness and lack of appetite since coming in January. They brought her to the ED today due to more of an a     used for today's visit      Stroke Evaluation Summarized    MRI/Head CT MRI: acute R temporal infarct, chronic SVID, scattered chronic microhemorrhages in both cortical and deep locations   Intracranial Vasculature MRA: no significant stenosis, some irregularity of prudence MCA, PCAs, R vert likely due to athero   Cervical Vasculature MRA: no significant stenosis     Echocardiogram EF >65%, LA borderline dilated, no regional WMAs, negative bubble study   EKG/Telemetry SR with 1st degree AVB   Other Testing Not Applicable      LDL  8/19/2024: 51 mg/dL   A1C  8/19/2024: 9.9 %   Troponin 8/19/2024: 17 ng/L       Impression  Acute ischemic stroke of R temporal due to embolic stroke of undetermined source (ESUS)  Scattered chronic microhemorrhages in both cortical and deep locations  Cognitive impairment      Recommendations   - Neurochecks and Vital Signs every 4 hours   - Treat for SBP >180 while inpatient, then goal BP <130/80 with tighter  "control associated with decreased overall CV risk, if tolerated. Excellent blood pressure control will decrease risk of ICH in the setting of suspected CAA.  - Daily aspirin 81 mg for secondary stroke prevention. Would not do DAPT given duration of symptoms >48 hours and chronic microhemorrhage noted on MRI with potential associated increased bleeding risk.   - Statin:  continue PTA atorvastatin 40 mg, LDL within goal range of 40-70  - Telemetry, EKG  - Discharge with 14 day Zio Patch (ordered)  - Bedside Glucose Monitoring  - Hgb A1c goal <7%, management per PCP  - Nutrition: SLP to evaluate and recommend diet  - PT/OT/SLP  - Stroke Education  - Depression Screen  - Euthermia, Euglycemia  - Management of hyponatremia per primary team  - Delirium precautions      Patient Follow-up    - in 6-8 weeks with general neurology ([phone]) - can be seen by gen neuro both for stroke follow up as well as for cognitive impairment (ordered)    Thank you for this consult. No further stroke evaluation is recommended, so we will sign off. Please contact us with any additional questions.    Nelly Key PA-C  Vascular Neurology    To page me or covering stroke neurology team member, click here: [facility]  Choose \"On Call\" tab at top, then select \"NEUROLOGY/ALL SITES\" from middle drop-down box, press Enter, then look for \"stroke\" or \"telestroke\" for your site.  _____________________________________________________    Clinically Significant Risk Factors Present on Admission        # Hypokalemia: Lowest K = 2.9 mmol/L in last 2 days, will replace as needed  # Hyponatremia: Lowest Na = 122 mmol/L in last 2 days, will monitor as appropriate    # Hypomagnesemia: Lowest Mg = 1.6 mg/dL in last 2 days, will replace as needed     # Drug Induced Platelet Defect: home medication list includes an antiplatelet medication   # Hypertension: Home medication list includes antihypertensive(s)        # DMII: A1C = 9.9 % (Ref range: <5.7 %) within past " "6 months    # Overweight: Estimated body mass index is 26.76 kg/m  as calculated from the following:    Height as of this encounter: 1.372 m (4' 6\").    Weight as of this encounter: 50.3 kg (111 lb).                    Past Medical History    No past medical history on file.  Medications   Home Meds  Prior to Admission medications    Not on File       Scheduled Meds  Current Facility-Administered Medications   Medication Dose Route Frequency Provider Last Rate Last Admin    [START ON 8/20/2024] aspirin EC tablet 81 mg  81 mg Oral Daily More, Nelly C, PA-C        Or    [START ON 8/20/2024] aspirin (ASA) Suppository 75 mg  75 mg Rectal Daily More, Nelly C, PA-C        atorvastatin (LIPITOR) tablet 40 mg  40 mg Oral QPM More, Nelly C, PA-C        famotidine (PEPCID) tablet 20 mg  20 mg Oral BID Nicolasa Pickering MD        insulin aspart (NovoLOG) injection (RAPID ACTING)  1-7 Units Subcutaneous TID AC Nicolasa Pickering MD        insulin aspart (NovoLOG) injection (RAPID ACTING)  1-5 Units Subcutaneous At Bedtime Nicolasa Pickering MD   Given at 08/19/24 0703    sodium chloride (PF) 0.9% PF flush 3 mL  3 mL Intracatheter Q8H Nicolasa Pickering MD        sodium chloride (PF) 0.9% PF flush 3 mL  3 mL Intracatheter Q8H Nicolasa Pickering MD           Infusion Meds  Current Facility-Administered Medications   Medication Dose Route Frequency Provider Last Rate Last Admin    sodium chloride 0.9 % infusion   Intravenous Continuous Nicolasa Pickering  mL/hr at 08/19/24 0935 New Bag at 08/19/24 0935       Allergies   Allergies   Allergen Reactions    Iodinated Contrast Media Shortness Of Breath and Unknown    Omeprazole Shortness Of Breath and Unknown          PHYSICAL EXAMINATION   Temp:  [97.3  F (36.3  C)] 97.3  F (36.3  C)  Pulse:  [61-72] 68  Resp:  [16-28] 28  BP: (152-193)/() 159/77  SpO2:  [91 %-100 %] 98 %    General Exam  General:  patient lying in bed without any acute distress, restless  HEENT:  " normocephalic/atraumatic  Pulmonary:  no respiratory distress    Neuro Exam  Mental Status:   alert, oriented to self, cannot state month, does not know why she is in the hospital, restless, wanting to leave, per  speech is clear but confused, can name objects and follows commands though frequently requires repeat requests  Cranial Nerves:  visual fields intact (tested by nurse), EOMI with normal smooth pursuit, facial sensation intact and symmetric (tested by nurse), facial movements symmetric, hearing not formally tested but intact to conversation, no dysarthria  Motor:  no abnormal movements, does not follow commands for upper extremity strength testing, lifts both arms off bed but does not hold for 10 seconds,  strength equal per RN, BLEs holds against gravity for 5 seconds    Reflexes:  unable to test (telestroke)  Sensory:   limited assessment due to mental status, states different when asked about whether touch on both arms is equal but is unable to further characterize, no verbal response when asked about sensation in the BLEs  Coordination:  limited assessment due to mental status, no incoordination of extremity movements observed  Station/Gait:  unable to test due to telestroke    Stroke Scales    NIHSS  1a. Level of Consciousness 0-->Alert, keenly responsive   1b. LOC Questions 2-->Answers neither question correctly   1c. LOC Commands 0-->Performs both tasks correctly   2.   Best Gaze 0-->Normal   3.   Visual 0-->No visual loss   4.   Facial Palsy 0-->Normal symmetrical movements   5a. Motor Arm, Left 2-->Some effort against gravity, limb cannot get to or maintain (if cued) 90 (or 45) degrees, drifts down to bed, but has some effort against gravity   5b. Motor Arm, Right 2-->Some effort against gravity, limb cannot get to or maintain (if cued) 90 (or 45) degrees, drifts down to bed, but has some effort against gravity   6a. Motor Leg, Left 0-->No drift, leg holds 30 degree position for full  "5 secs   6b. Motor Leg, right 0-->No drift, leg holds 30 degree position for full 5 secs   7.   Limb Ataxia 0-->Absent   8.   Sensory 1-->Mild-to-moderate sensory loss, patient feels pinprick is less sharp or is dull on the affected side, or there is a loss of superficial pain with pinprick, but patient is aware of being touched   9.   Best Language 0-->No aphasia, normal   10. Dysarthria 0-->Normal   11. Extinction and Inattention  0-->No abnormality   Total 7 (08/19/24 1201)       Imaging  I personally reviewed all imaging; relevant findings per HPI.    Labs Data   CBC  Recent Labs   Lab 08/19/24  0104   WBC 8.9   RBC 3.83   HGB 11.4*   HCT 31.9*        Basic Metabolic Panel   Recent Labs   Lab 08/19/24  1303 08/19/24  0945 08/19/24  0648 08/19/24  0644 08/19/24  0524 08/19/24  0104   *  --  123*  --  122* 123*   POTASSIUM  --   --  2.9*  --  2.9* 3.6   CHLORIDE  --   --  84*  --  85* 81*   CO2  --   --  23  --  21* 30*   BUN  --   --  18.9  --  20.6 23.6*   CR  --   --  1.42*  --  1.41* 1.72*   GLC  --  76 85 73 68* 153*   [facility]  --   --  8.9  --  8.1* 8.6*     Liver Panel  No results for input(s): \"PROTTOTAL\", \"ALBUMIN\", \"BILITOTAL\", \"ALKPHOS\", \"AST\", \"ALT\", \"BILIDIRECT\" in the last 168 hours.  INR  No lab results found.        Stroke Consult Data Data   Telestroke Service Details  (for non-emergent stroke consult with tele)  Video start time 08/19/24   1201   Video end time 08/19/24   1243   Type of service telemedicine diagnostic assessment of acute neurological changes   Reason telemedicine is appropriate patient requires assessment with a specialist for diagnosis and treatment of neurological symptoms   Mode of transmission secure interactive audio and video communication per Elizabeth   Originating site (patient location) [facility]    Distant site (provider location) [facility]       I have personally spent a total of 70 minutes " providing care today, time spent in reviewing medical records and devising the plan as recorded above.

## 2024-08-19 NOTE — PROGRESS NOTES
"   08/19/24 3915   Appointment Info   Signing Clinician's Name / Credentials (PT) Jennifer Perez, PT, DPT       Present yes   Language Hmong via Code Fever   Living Environment   People in Home child(stefanie), adult  (Son & Daughter - luis enrique present and reported able to assist at all times)   Current Living Arrangements house   Home Accessibility stairs to enter home;stairs within home   Number of Stairs, Main Entrance 1   Stair Railings, Main Entrance railings on both sides of stairs   Number of Stairs, Within Home, Primary seven   Stair Railings, Within Home, Primary railings on both sides of stairs   General Information   Onset of Illness/Injury or Date of Surgery 08/19/24   Referring Physician Nicolasa Pickering MD   Patient/Family Therapy Goals Statement (PT) Return to Home   Pertinent History of Current Problem (include personal factors and/or comorbidities that impact the POC) Per Chart Review -\"64 year old  female with history of DM2, essential hypertension, dyslipidemia, history of CVA, dementia, GERD, came with confusion, headache, difficulty walking for 2 days found to have moderate dehydration, acute kidney injury with creatinine 1.72, hyponatremia with sodium 123 and acute/subacute right temporal lobe infarct on MRI scan.  Stroke neurology will be notified from ED.  Admitted to the hospital for further evaluation and treatment\"   Existing Precautions/Restrictions fall   Range of Motion (ROM)   Range of Motion ROM is WFL   Strength (Manual Muscle Testing)   Strength (Manual Muscle Testing) Deficits observed during functional mobility   Bed Mobility   Bed Mobility supine-sit   Supine-Sit [place] (Bed Mobility) supervision;verbal cues   Transfers   Transfers sit-stand transfer   Sit-Stand Transfer   Sit-Stand [place] (Transfers) supervision;verbal cues;contact guard   Assistive Device (Sit-Stand Transfers) walker, front-wheeled   Gait/Stairs (Locomotion)   [place] Level (Gait) " supervision;verbal cues;contact guard   Assistive Device (Gait) walker, front-wheeled   Distance in Feet (Gait) 5   Pattern (Gait) step-through;swing-through   Deviations/Abnormal Patterns (Gait) gait speed decreased;nik decreased   Clinical Impression   Criteria for Skilled Therapeutic Intervention Yes, treatment indicated   PT Diagnosis (PT) Impaired Functional Mobility   Influenced by the following impairments weakness, impaired balance   Functional limitations due to impairments gait, transfers, stairs   Clinical Presentation (PT Evaluation Complexity) stable   Clinical Presentation Rationale pt presents as medically diagnosed   Clinical Decision Making (Complexity) low complexity   Planned Therapy Interventions (PT) balance training;bed mobility training;gait training;home exercise program;neuromuscular re-education;ROM (range of motion);stair training;strengthening;transfer training;home program guidelines   Risk & Benefits of therapy have been explained evaluation/treatment results reviewed;patient   PT Total Evaluation Time   PT Eval, Low Complexity Minutes (11881) 10   Physical Therapy Goals   PT Frequency 6x/week   PT Predicted Duration/Target Date for Goal Attainment 08/26/24   PT Goals Transfers;Gait;Stairs   PT: Transfers Modified independent;Sit to/from stand;Assistive device   PT: Gait Supervision/stand-by assist;Rolling walker;Within precautions;100 feet   PT: Stairs Minimal assist;7 stairs;Rail on both sides   Interventions   Interventions Quick Adds Gait Training;Therapeutic Procedure;Therapeutic Activity   Therapeutic Procedure/Exercise   Ther. Procedure: strength, endurance, ROM, flexibillity Minutes (97514) 10   Symptoms Noted During/After Treatment fatigue   Treatment Detail/Skilled Intervention Reclined LE therex: cueing for safety/technique/specific muscle activation, CGA - verbal & tactile cueing for technique. visual demos of activity   Therapeutic Activity   Treatment Detail/Skilled  Intervention stand to sit & sit to supine: cueing for safety/techniqe/hand placement, SBA - handoff to NA at end of session   Gait Training   Gait Training Minutes (29994) 8   Symptoms Noted During/After Treatment (Gait Training) fatigue   Treatment Detail/Skilled Intervention pt ambulated in hallway with FWW. cueing for safety/technique/posture/FWW management. initally R path deviation then L path deviation requiring Min A for walker management - improvements at end of ambulation   Distance in Feet 75   [place] Level (Gait Training) contact guard   Physical Assistance Level (Gait Training) supervision;verbal cues   Weight Bearing (Gait Training) full weight-bearing   Assistive Device (Gait Training) rolling walker   Pattern Analysis (Gait Training) swing-through gait   Gait Analysis Deviations decreased nik;decreased step length   Impairments (Gait Analysis/Training) balance impaired;strength decreased   PT Discharge Planning   PT Plan stairs, gait as denae, LE Therex, sit to/from stand   PT Discharge Recommendation (DC Rec) home with assist;home with home care physical therapy   PT Rationale for DC Rec pending safe stair trial, anticipate pt will be able to complete required functional mobility at home with assist. Daughter reports is able to assist & so is pt's son. pt has 24/7 supervision/assist. FWW for added stability recommended. Home PT for further progressing strength/balance   PT Brief overview of current status CGA gait with FWW 75'; SBA stand to sit & sit to supine   PT Equipment Needed at Discharge walker, rolling  (will need at d/c)   Total Session Time   Timed Code Treatment Minutes 18   Total Session Time (sum of timed and untimed services) 28

## 2024-08-19 NOTE — PROGRESS NOTES
"Speech-Language Pathology: Clinical Swallow Evaluation      08/19/24 1030   Appointment Info   Signing Clinician's Name / Credentials (SLP) Mónica He MA, CCC-SLP   General Information   Onset of Illness/Injury or Date of Surgery 08/19/24   Referring Physician Nicolasa Pickering MD   Pertinent History of Current Problem Per H&P, \"Milady Pollock is a 64 year old  female with history of DM2, essential hypertension, dyslipidemia, history of CVA, dementia, GERD, came with confusion, headache, difficulty walking for 2 days found to have moderate dehydration, acute kidney injury with creatinine 1.72, hyponatremia with sodium 123 and acute/subacute right temporal lobe infarct on MRI scan.  Stroke neurology will be notified from ED.  Admitted to the hospital for further evaluation and treatment.\"   General Observations Patient alert and cooperative.       Present yes  (via Language Line)   Language Hmong   Type of Evaluation   Type of Evaluation Swallow Evaluation   Oral Motor   Oral Musculature generally intact   Mucosal Quality sticky   Oral Motor Deficits Observed Dentition (Oral Motor) (Group)   Dentition (Oral Motor)   Dentition (Oral Motor) natural dentition;adequate dentition   Facial Symmetry (Oral Motor)   Facial Symmetry (Oral Motor) WNL   Comment, Facial Symmetry (Oral Motor) No facial droop/asymmetry appreciated.   Lip Function (Oral Motor)   Lip Range of Motion (Oral Motor) WNL   Lip Strength (Oral Motor) WNL   Tongue Function (Oral Motor)   Tongue Coordination/Speed (Oral Motor) reduced rate   Tongue ROM (Oral Motor) WNL   Jaw Function (Oral Motor)   Jaw Function (Oral Motor) WNL   Facial Sensation   Facial Sensation WNL   Vocal Quality/Secretion Management (Oral Motor)   Vocal Quality (Oral Motor) WNL   Secretion Management (Oral Motor) WNL   Comment, Vocal Quality/Secretion Management (Oral Motor) Patient's vocal quality was clear.   General Swallowing Observations   Past History of " "Dysphagia None per cursory review of EMR.   Comment, General Swallowing Observations Patient reports having increased difficulty swallowing over the past couple of days. She is unable to provide much detail aside from stating that her throat felt \"tight\".   Respiratory Support room air   Current Diet/Method of Nutritional Intake (General Swallowing Observations, NIS) NPO   Swallowing Evaluation Clinical swallow evaluation   Clinical Swallow Evaluation   Feeding Assistance set up only required   Clinical Swallow Evaluation Textures Trialed thin liquids;pureed;solid foods   Clinical Swallow Eval: Thin Liquid Texture Trial   Mode of Presentation, Thin Liquids cup;straw;self-fed   Volume of Liquid or Food Presented >2 ounces   Oral Phase of Swallow other (see comments)  (Brief oral holding w/ first 2 sips)   Pharyngeal Phase of Swallow intact   Diagnostic Statement Subjectively, swallow response appeared timely. No overt clinical s/sx of aspiration observed.   Clinical Swallow Evaluation: Puree Solid Texture Trial   Mode of Presentation, Puree spoon;self-fed   Volume of Puree Presented 4 ounces   Oral Phase, Puree WFL   Pharyngeal Phase, Puree intact   Diagnostic Statement No overt clinical s/sx of aspiration observed.   Clinical Swallow Evaluation: Solid Food Texture Trial   Mode of Presentation self-fed   Volume Presented 5 bites   Oral Phase delayed AP movement  (Likely related to dry oral cavity)   Pharyngeal Phase intact   Diagnostic Statement Patient demonstrated prolonged but adequate mastication. Notably, she was simultaneously chewing and talking with the . A-P bolus transit was mildly effortful, though this appeared related to dryness of the solid (cracker), as well as fact that patient's mouth was dry. Mild-moderate diffuse oral stasis cleared with liquid washes. No overt clinical s/sx of aspiration observed.   Esophageal Phase of Swallow   Patient reports or presents with symptoms of esophageal " dysphagia No   Swallowing Recommendations   Diet Consistency Recommendations regular diet;thin liquids (level 0)   Supervision Level for Intake patient independent   Mode of Delivery Recommendations bolus size, small;slow rate of intake   Monitoring/Assistance Required (Eating/Swallowing) stop eating activities when fatigue is present;monitor for cough or change in vocal quality with intake   Recommended Feeding/Eating Techniques (Swallow Eval) maintain upright sitting position for eating;set-up and prepare tray   Medication Administration Recommendations, Swallowing (SLP) As tolerated; likely one at a time, mixed in applesauce, followed by sips of liquid as needed.   Instrumental Assessment Recommendations instrumental evaluation not recommended at this time   General Therapy Interventions   Planned Therapy Interventions Dysphagia Treatment   Dysphagia treatment Instruction of safe swallow strategies   Clinical Impression   Criteria for Skilled Therapeutic Interventions Met (SLP Eval) Yes, treatment indicated   SLP Diagnosis Dysphagia   Clinical Impression Comments Clinical swallow evaluation completed per MD order. Oral motor exam was unremarkable. No facial weakness/asymmetry noted. Patient demonstrated mildly prolonged mastication and A-P bolus transit with a regular texture solid (ramone cracker). This appeared related to the dryness of the cracker as well as the fact that patient's oral cavity was dry. There was no direct evidence of pharyngeal dysphagia during this evaluation.     At this time, patient appears appropriate for diet of Regular food textures and thin liquids with use of the safe swallowing strategies indicated above. Speech Therapy will follow to monitor diet tolerance and reinforce strategies as needed. A speech/language evaluation was deferred as patient's speech is clear (i.e., no dysarthria noted) and she appears to be functioning at her cognitive-linguistic baseline.   SLP Total Evaluation  Time   Eval: oral/pharyngeal swallow function, clinical swallow Minutes (95582) 24   SLP Goals   Therapy Frequency (SLP Eval) 5 times/week   SLP Predicted Duration/Target Date for Goal Attainment 08/23/24   SLP Goals Swallow   SLP: Safely tolerate diet without signs/symptoms of aspiration Regular diet;Thin liquids;Independently   SLP Discharge Planning   SLP Plan F/U re: diet tolerance.   SLP Discharge Recommendation home with assist   SLP Rationale for DC Rec Swallow function appears at/near baseline.   SLP Brief overview of current status  Recommend diet of Regular textures and thin liquids. Patient to sit fully upright for all intake, eat slowly, and take small bites/sips. Pills to be given one at a time, mixed in applesauce. Crush only as needed.   Total Session Time   Total Session Time (sum of timed and untimed services) 24

## 2024-08-19 NOTE — ED PROVIDER NOTES
NAME: Milady Pollock  AGE: 64 year old female  YOB: 1960  MRN: [id number]  EVALUATION DATE & TIME: 2024 12:44 AM    PCP: Krish Escudero    ED PROVIDER: Olvin Mclean M.D.      Chief Complaint   Patient presents with    Headache     FINAL IMPRESSION:  1. Disequilibrium    2. Hypomagnesemia    3. Hyponatremia    4. Difficulty eating      MEDICAL DECISION MAKIN:52 AM I met with the patient, obtained history, performed an initial exam, and discussed options and plan for diagnostics and treatment here in the ED.   2:18 AM I rechecked and updated the patient on results. I talked with the daughter to gather additional history.   4:39 AM MRI results came back and radiology called with findings of a small acute right temporal infarct.  Unsure if this would be causing her disequilibrium but also the hyponatremia could be contributing along with the infarct.  Patient will be discussed with stroke neurology.  5:13 AM stroke neurology has been paged but unfortunately have not heard back.  Will discuss with the hospitalist for admission and await their call back.  5:37 AM patient discussed with Dr. Pickering for admission to cardiac telemetry for correction of electrolytes, monitoring of hemoglobin and stroke neuro follow-up.  5:42 AM stroke neuro, Dr. French called back and will evaluate patient's imaging and records for possible recommendations such as Plavix or further treatment.    Patient was clinically assessed and consented to treatment. After assessment, medical decision making and workup were discussed with the patient. The patient was agreeable to plan for testing, workup, and treatment.  Pertinent Labs & Imaging studies reviewed. (See chart for details)       Medical Decision Making  Obtained supplemental history:Supplemental history obtained?: Documented in chart  Reviewed external records: External records reviewed?: Documented in chart  Care impacted by chronic illness:Dementia,  Diabetes, and Hypertension  Care significantly affected by social determinants of health:Access to Medical Care  Did you consider but not order tests?: In addition to work-up documented, I considered the following work up:   Did you interpret images independently?: Independent interpretation of ECG and images noted in documentation, when applicable.  Consultation discussion with other provider:Did you involve another provider (consultant, , pharmacy, etc.)?: I discussed the care with another health care provider, see documentation for details.  Admit.    Milady Pollock is a 64 year old female who presents with headache and off-balance.   Differential diagnosis includes but not limited to CVA, TIA, electrolyte imbalance, vertigo, dementia.  Patient is a 64-year-old female with history of past CVA and dementia per daughter's report.  She also has history of hypertension and presents for headache with difficulty walking for the last 2 days.  Patient's daughter reports that she does usually have some disequilibrium associated with her past stroke and dementia but has been acutely worse last 2 days.  Daughter reports headache as well with and she has not been eating as well in the last several days.  They had moved here from [place] and are still establishing care and have not seen neurology yet but have been seen at [facility] primary care and referred to neurology however to several months out.  Previously living in [place] they had seen neurologist and were cared for regularly.  Patient does complain about headache and on ambulation does require staff assist to help prevent fall and maintain direction.  Standing on her own she does not fall when standing straight up and transitioning but any sort of movement forward she does seem to be off balance.  After discussion with daughter given that this been going on for at least 2 days patient was started with labs and we will proceed straight MRI and CT scan from March of this  year at [facility] was negative for any acute vascular disease but did show stenosis.  MRI/MRI ordered and labs returned showing significant for hyponatremia, hypomagnesemia, and slight elevation in creatinine.  Patient has had some chronic kidney disease but 1.7 is higher than previous.  Last creatinine I can find was 1.44 in [place].  Patient will be given a small amount of fluids as well as repletion of magnesium and Zofran and meclizine given for the disequilibrium and vertigo.  Additionally no leukocytosis and awaiting urinalysis but patient's hemoglobin was down from 13 back in March.  She does have history of gastric ulcers and per daughter reports she has complained about some stomach pains but again per chart review and daughter reports she has always complained about stomach pains.  Given the drop in hemoglobin I would recommend following the hemoglobin up and she is allergic to PPIs so we will give a dose of Pepcid.  No vomiting while in the ER but patient does hold onto an emesis bag and when asking daughter about symptoms she mostly reports headache.  MRI pending at this time however given the hyponatremia and symptoms likely related to that I would plan for admission.  Will still await MRI for any acute or subacute findings and proceed with admission as appropriate.  MRI returned showing acute/subacute right temporal infarct.  This may be contributing to the left corner of the mouth drooping but I do not find any other acute facial or left-sided deficit.  Patient started on electrolyte repletion and I did give a small dose of pain medication for reported headache.  Additionally given the drop in hemoglobin from 13-11 since March and history of gastric ulcer I am concerned about anticoagulation with the stroke and possible bleeding ulcer.  Patient's daughter does report she has had this in the past and on review of her chart this has been noted.  I did load her with Pepcid but unable to get PPI due to  "allergies and will need to monitor hemoglobin with possible evaluation with GI but awaiting recommendations from stroke neuro.  Patient discussed with hospitalist for admission and further management.    0 minutes of critical care time    MEDICATIONS GIVEN IN THE EMERGENCY:  Medications   ondansetron (ZOFRAN) injection 4 mg (4 mg Intravenous $Given 8/19/24 0106)   meclizine (ANTIVERT) tablet 12.5 mg (12.5 mg Oral $Given 8/19/24 0119)   sodium chloride 0.9% BOLUS 500 mL (0 mLs Intravenous Stopped 8/19/24 0439)   magnesium sulfate 2 g in 50 mL sterile water intermittent infusion (0 g Intravenous Stopped 8/19/24 0439)   gadobutrol (GADAVIST) injection 5 mL (5 mLs Intravenous $Given 8/19/24 0335)   famotidine (PEPCID) injection 20 mg (20 mg Intravenous $Given 8/19/24 0412)   HYDROmorphone (DILAUDID) injection 0.2 mg (0.2 mg Intravenous $Given 8/19/24 0574)       NEW PRESCRIPTIONS STARTED AT TODAY'S ER VISIT:  New Prescriptions    No medications on file          =================================================================    HPI    Patient information was obtained from: daughter     Use of : N/A         Milady Pollock is a 64 year old female with a past medical history of dementia, T2DM, and hypertension, who presents with headache.     HPI limited due to dementia.    Per daughter,  The patient presents with 2 days of a progressively worsening headache, dizziness, nausea, and diarrhea. Her daughter has noticed that the patient will sway while walking and seems of-balance. They suspect these symptoms are side effects of her Metformin. The patient is a diabetic and her blood glucose was 400 earlier today. No recent change in appetite. They deny any other complaints at this time.     They recently moved to [place] from [place], [place]. She was referred to a neurology clinic here but was told they were \"full\" so they are looking for another one.       REVIEW OF SYSTEMS   Review of Systems   Constitutional:  Negative for " appetite change.   Gastrointestinal:  Positive for diarrhea and nausea.   Neurological:  Positive for dizziness and headaches.   All other systems reviewed and are negative.       PAST MEDICAL HISTORY:  No past medical history on file.    PAST SURGICAL HISTORY:  No past surgical history on file.    CURRENT MEDICATIONS:      Current Facility-Administered Medications:     ondansetron (ZOFRAN) injection 4 mg, 4 mg, Intravenous, Q30 Min PRN, Olvin Mclean MD, 4 mg at 08/19/24 0106  No current outpatient medications on file.    ALLERGIES:  Allergies   Allergen Reactions    Iodinated Contrast Media Shortness Of Breath and Unknown    Omeprazole Shortness Of Breath and Unknown       FAMILY HISTORY:  No family history on file.    SOCIAL HISTORY:   Social History     Socioeconomic History    Marital status:      Social Determinants of Health      Received from [facility]    [employer]       PHYSICAL EXAM:    Vitals: BP (!) 152/74   Pulse 61   Temp 97.3  F (36.3  C) (Oral)   Resp 17   Wt 50.3 kg (111 lb)   SpO2 93%    Physical Exam  Vitals and nursing note reviewed.   Constitutional:       General: She is not in acute distress.     Appearance: Normal appearance. She is normal weight.   HENT:      Head: Atraumatic.   Eyes:      Extraocular Movements: Extraocular movements intact.      Pupils: Pupils are equal, round, and reactive to light.   Cardiovascular:      Rate and Rhythm: Normal rate and regular rhythm.      Heart sounds: Normal heart sounds.   Pulmonary:      Effort: Pulmonary effort is normal. No respiratory distress.      Breath sounds: Normal breath sounds.   Abdominal:      General: There is no distension.      Palpations: Abdomen is soft.      Tenderness: There is no abdominal tenderness. There is no right CVA tenderness, left CVA tenderness or guarding.   Musculoskeletal:         General: No signs of injury.      Cervical back: Normal range of  motion and neck supple. No tenderness.      Right lower leg: No edema.      Left lower leg: No edema.   Skin:     General: Skin is warm and dry.      Coloration: Skin is not jaundiced or pale.   Neurological:      Mental Status: She is alert. She is disoriented.      Cranial Nerves: Cranial nerve deficit (Slight left-sided corner of the mouth with droop) present.      Sensory: No sensory deficit.      Motor: No weakness.      Coordination: Coordination abnormal.      Gait: Gait abnormal.   Psychiatric:         Mood and Affect: Affect is flat.         Behavior: Behavior is withdrawn.        LAB:  All pertinent labs reviewed and interpreted.  Labs Ordered and Resulted from Time of ED Arrival to Time of ED Departure   BASIC METABOLIC PANEL - Abnormal       Result Value    Sodium 123 (*)     Potassium 3.6      Chloride 81 (*)     Carbon Dioxide (CO2) 30 (*)     Anion Gap 12      Urea Nitrogen 23.6 (*)     Creatinine 1.72 (*)     GFR Estimate 33 (*)     Calcium 8.6 (*)     Glucose 153 (*)    TROPONIN T, HIGH SENSITIVITY - Abnormal    Troponin T, High Sensitivity 29 (*)    MAGNESIUM - Abnormal    Magnesium 1.6 (*)    CBC WITH PLATELETS AND DIFFERENTIAL - Abnormal    WBC Count 8.9      RBC Count 3.83      Hemoglobin 11.4 (*)     Hematocrit 31.9 (*)     MCV 83      MCH 29.8      MCHC 35.7      RDW 12.0      Platelet Count 165      % Neutrophils 57      % Lymphocytes 32      % Monocytes 8      % Eosinophils 2      % Basophils 0      % Immature Granulocytes 0      NRBCs per 100 WBC 0      Absolute Neutrophils 5.1      Absolute Lymphocytes 2.9      Absolute Monocytes 0.7      Absolute Eosinophils 0.2      Absolute Basophils 0.0      Absolute Immature Granulocytes 0.0      Absolute NRBCs 0.0     TROPONIN T, HIGH SENSITIVITY   ROUTINE UA WITH MICROSCOPIC REFLEX TO CULTURE     RADIOLOGY:  MRA Angiogram Neck w/o & w Contrast   Final Result   IMPRESSION:   HEAD MRI:    1.  Acute/subacute right temporal lobe infarct without  hemorrhagic conversion.   2.  Chronic senescent and presumed microvascular ischemic changes, as above.   3.  Scattered foci of susceptibility artifact within the supratentorial brain within peripheral and central distributions, likely reflecting chronic microhemorrhage versus amyloid angiopathy (or a combination of both).      HEAD MRA:    1.  No significant stenosis, large vessel occlusion, aneurysm or high flow vascular malformation.      NECK MRA:   1.  No significant stenosis or dissection.                  Dr. Giancarlo Chavez discussed results with Dr. Mclean on 8/19/2024 at 4:36 AM CDT.         MRA Angiogram Head w/o Contrast   Final Result   IMPRESSION:   HEAD MRI:    1.  Acute/subacute right temporal lobe infarct without hemorrhagic conversion.   2.  Chronic senescent and presumed microvascular ischemic changes, as above.   3.  Scattered foci of susceptibility artifact within the supratentorial brain within peripheral and central distributions, likely reflecting chronic microhemorrhage versus amyloid angiopathy (or a combination of both).      HEAD MRA:    1.  No significant stenosis, large vessel occlusion, aneurysm or high flow vascular malformation.      NECK MRA:   1.  No significant stenosis or dissection.                  Dr. Giancarlo Chavez discussed results with Dr. Mclean on 8/19/2024 at 4:36 AM CDT.         MR Brain w/o & w Contrast   Final Result   IMPRESSION:   HEAD MRI:    1.  Acute/subacute right temporal lobe infarct without hemorrhagic conversion.   2.  Chronic senescent and presumed microvascular ischemic changes, as above.   3.  Scattered foci of susceptibility artifact within the supratentorial brain within peripheral and central distributions, likely reflecting chronic microhemorrhage versus amyloid angiopathy (or a combination of both).      HEAD MRA:    1.  No significant stenosis, large vessel occlusion, aneurysm or high flow vascular malformation.      NECK MRA:   1.  No significant  stenosis or dissection.                  Dr. Giancarlo Chavez discussed results with Dr. Mclean on 8/19/2024 at 4:36 AM CDT.           EKG:   Performed at: 19-AUG-2024  Impression: Sinus rhythm with first-degree AV block, no signs of acute ST elevation ischemia, no ectopy or atrial fibrillation, no old EKG for comparison.  Rate: 69 BPM  Rhythm: Sinus rhythm with 1st degree A-V block  QRS Interval: 102 ms  QTc Interval: 440/471 ms  Comparison: No previous ECGs available.  I have independently reviewed and interpreted the EKG(s) documented above.     PROCEDURES:   Procedures     I, Dm Harley, am serving as a scribe to document services personally performed by Dr. Olvin Mclean  based on my observation and the provider's statements to me. I, Olvin Mclean MD attest that Dm Harley is acting in a scribe capacity, has observed my performance of the services and has documented them in accordance with my direction.    Olvin Mclean M.D.  Emergency Medicine  [facility] Emergency Department       Olvin Mclean MD  08/19/24 0560

## 2024-08-19 NOTE — ED NOTES
Patient unable to swallow Po Plvix and Aspirin, Provider aware. Writer reached out speech therapist. Waiting for speech therapy eval. Will continue to monitor.

## 2024-08-19 NOTE — PHARMACY-ADMISSION MEDICATION HISTORY
Pharmacist Admission Medication History    Admission medication history is complete. The information provided in this note is only as accurate as the sources available at the time of the update.    Medication reconciliation/reorder completed by provider prior to medication history? No    Information Source(s): Patient and CareEverywhere/SureScripts via in-person    Pertinent Information: spoke with patient's daughter who helps manage patient's medications. However, she was not familiar with all the medication names. There is a clinic note from 7/12/2024 when patient established care and was prescribed the medications below. There was a fill for famotidine on this same date, however, daughter states patient is not taking anything for heart burn.    Insulin - daughter was not familiar with the type or name of the insulin patient is using but confirmed they are doing a daily injection of 12 units. There is a diabetes follow up from 8/9/2024 that lists Tresiba as the insulin.    Changes made to PTA medication list:  Added: All medications are new to this encounter      Allergies reviewed with patient and updates made in EHR: yes    Medications available for use during hospital stay: NONE.     Medication History Completed By: Kassandra Arvizu RPH 8/19/2024 8:19 AM    PTA Med List   Medication Sig Last Dose    aspirin 81 MG EC tablet Take 81 mg by mouth daily 8/18/2024    atorvastatin (LIPITOR) 40 MG tablet Take 40 mg by mouth daily 8/18/2024    insulin degludec (TRESIBA) 100 UNIT/ML pen Inject 12 Units subcutaneously every 24 hours 8/18/2024    losartan-hydrochlorothiazide (HYZAAR) 50-12.5 MG tablet Take 1 tablet by mouth daily 8/18/2024    metFORMIN (GLUCOPHAGE) 500 MG tablet Take 1,000 mg by mouth 2 times daily (with meals) 8/18/2024    OPTIVE 0.5-0.9 % ophthalmic solution Place 1 drop into both eyes 3 times daily as needed for dry eyes 8/18/2024

## 2024-08-19 NOTE — PLAN OF CARE
Occupational Therapy: Orders received. Chart reviewed and discussed with care team.? Occupational Therapy not indicated due to pt lives with son and daughter who alternate who is home with her, between the two, someone is home with pt at all times and provide assist with all BADLs. Pt has been Ax1 for mobility and ADLs, per physical therapy. Pt's cognitive deficit is noted at baseline - recommend continued follow-up for neurocognitive assessment outpatient.?    Physical therapy to continue intervention while in hospital to address mobility and transfers. Defer discharge recommendations to physical therapy.? Will complete orders.

## 2024-08-19 NOTE — ED TRIAGE NOTES
Patient presents to the ED for evaluation of headache that started on 8/17/24, progressively getting worse. Daughter reports that she cannot walk straight. Daughter states that she has dementia and is generally confused      Triage Assessment (Adult)       Row Name 08/19/24 0029          Triage Assessment    Airway WDL WDL        Respiratory WDL    Respiratory WDL WDL        Skin Circulation/Temperature WDL    Skin Circulation/Temperature WDL WDL        Cardiac WDL    Cardiac WDL WDL        Peripheral/Neurovascular WDL    Peripheral Neurovascular WDL WDL        Cognitive/Neuro/Behavioral WDL    Cognitive/Neuro/Behavioral WDL WDL

## 2024-08-19 NOTE — ED NOTES
Patient pulled IV access and trying to leave. Daughter called. Patient saying that she doesn't need to be her she wants to home. Daughter still on the phone trying to convince her to stay

## 2024-08-19 NOTE — PROGRESS NOTES
"[facility]    Stroke Telephone Note    I was called by Nicolasa Pickering on 08/19/24 regarding patient Milady Pollock. The patient is a 64 year old female with a history of hyperlipidemia, hypertension, type 2 diabetes presents to the ED after she has been having unstable ambulation for the last 2 days and was noted to have a mild left-sided facial droop on ED examination.  She is currently on aspirin 81 and a history of gastric ulcers in the past.    Vitals  BP: (!) 152/74   Pulse: 61   Resp: 17   Temp: 97.3  F (36.3  C)   Weight: 50.3 kg (111 lb)    Imaging Findings  MRI brain with and without contrast:Acute/subacute right temporal lobe infarct without hemorrhagic conversion.   MRA head and neck: No significant stenosis/occlusion    Impression  Acute ischemic stroke of right temporal area due to small-vessel occlusion  /ESUS      Recommendations    - Use orderset: \"Ischemic Stroke Routine Admission\" or \"Ischemic Stroke No Thrombolytics/No Thrombectomy ICU Admission\"  - Place Neurology IP Stroke Consult order   - Neurochecks and Vital Signs every q4h   - Permissive HTN; goal SBP < 220 mmHg  - Load with Plavix 300 mg once followed by plavix 75 mg daily  - Statin: after lipid profile  - Hold ASA given gastric ulcer concerns  - TTE (with Bubble Study if age 60 yrs or less)  - Telemetry, EKG  - Bedside Glucose Monitoring  - A1c, Lipid Panel, Troponin x 3  - PT/OT/SLP  - Stroke Education  - Euthermia, Euglycemia    My recommendations are based on the information provided over the phone by Milady Pollock's in-person providers. They are not intended to replace the clinical judgment of her in-person providers. I was not requested to personally see or examine the patient at this time.     The Stroke Staff is Dr. Wade.    Duke French MD  Vascular Neurology Fellow    To page me or covering stroke neurology team member, click here: [email or web address]  Choose \"On Call\" tab at top, then select \"NEUROLOGY/ALL SITES\" from middle " "drop-down box, press Enter, then look for \"stroke\" or \"telestroke\" for your site.   "

## 2024-08-19 NOTE — CONSULTS
Care Management Initial Consult      General Information  Assessment completed with: Children, Daughter Devi  Type of CM/SW Visit: Initial Assessment  Primary Care Provider verified and updated as needed: Yes   Readmission within the last 30 days: no previous admission in last 30 days   Reason for Consult: discharge planning, health care directive, transportation  Advance Care Planning: Advance Care Planning Reviewed: no concerns identified        Communication Assessment  Patient's communication style: spoken language (English or Bilingual)        Cognitive  Cognitive/Neuro/Behavioral: motor response  Level of Consciousness: confused, lethargic  Arousal Level: opens eyes spontaneously                Living Environment:   People in home: child(stefanie), adult     Current living Arrangements: house      Able to return to prior arrangements: yes     Family/Social Support:  Care provided by: child(stefanie)  Provides care for: no one, unable/limited ability to care for self  Marital Status:   Children          Description of Support System: Supportive, Involved    Support Assessment: Adequate family and caregiver support, Adequate social supports    Current Resources:   Patient receiving home care services: No  Community Resources: County Worker  Equipment currently used at home: cane, straight  Supplies currently used at home: Other (None identified)    Employment/Financial:  Employment Status: retired     Financial Concerns: none   Referral to Financial Worker: No     Does the patient's insurance plan have a 3 day qualifying hospital stay waiver?  No    Lifestyle & Psychosocial Needs:  Social Determinants of Health     Food Insecurity: Not on file   Depression: Not on file   Housing Stability: Not on file   Tobacco Use: Low Risk  (7/12/2024)    [email or web address]    Patient History     Smoking Tobacco Use: Never     Smokeless Tobacco Use: Never     Passive Exposure: Not on  file   Financial Resource Strain: Not on file   Alcohol Use: Not on file   Transportation Needs: Not on file   Physical Activity: Not on file   Interpersonal Safety: Not on file   Stress: Not on file   Social Connections: Unknown (7/12/2024)    Received from [facility]    Social Connections     Frequency of Communication with Friends and Family: Not on file   Health Literacy: Not on file     Functional Status:  Prior to admission patient needed assistance:   Dependent ADLs:: Ambulation-cane, Bathing, Dressing, Grooming, Transfers  Dependent IADLs:: Cleaning, Cooking, Laundry, Shopping, Meal Preparation, Medication Management, Money Management, Transportation  Assessment of Functional Status: Not at  functional baseline    Mental Health Status:  Mental Health Status: No Current Concerns       Chemical Dependency Status:  Chemical Dependency Status: No Current Concerns           Values/Beliefs:  Spiritual, Cultural Beliefs, Sikhism Practices, Values that affect care: no (None identified)             Additional Information:  Writer met with pt and her daughter Devi to introduce Care Management(CM), obtain an initial assessment, and discuss discharge. Pt resides in a house with er dtr and a son. Adult children provide all I/ADL support currently. Family are working with the [facility] to get patient PCA services. This will reportedly take up to six months per Devi. As needing extra support prior to admit, Devi was provided information to assist with finding resources in the community. Devi is accepting of any recommended therapies discovered through assessments. Pt/family was provided with the Medicare Compare list for Home Care.  Discussed associated Medicare star ratings to assist with choice for referrals/discharge planning Yes. Education was given to pt/family that star ratings are updated/maintained by Medicare and can be reviewed by visiting www.medicare.gov Yes - No  "preferences and undetermined if appropriate at this time.    8/19 per MINOO Jiménez-\"64-year-old female with history of past CVA and dementia per daughter's report.  She also has history of hypertension and presents for headache with difficulty walking for the last 2 days.  Patient's daughter reports that she does usually have some disequilibrium associated with her past stroke and dementia but has been acutely worse last 2 days.  Daughter reports headache as well with and she has not been eating as well in the last several days.  They had moved here from [place] and are still establishing care and have not seen neurology yet but have been seen at [facility] primary care and referred to neurology however to several months out.  Previously living in [place] they had seen neurologist and were cared for regularly.  Patient does complain about headache and on ambulation does require staff assist to help prevent fall and maintain direction.  Standing on her own she does not fall when standing straight up and transitioning but any sort of movement forward she does seem to be off balance.  After discussion with daughter given that this been going on for at least 2 days patient was started with labs and we will proceed straight MRI and CT scan from March of this year at [facility] was negative for any acute vascular disease but did show stenosis.  MRI/MRI ordered and labs returned showing significant for hyponatremia, hypomagnesemia, and slight elevation in creatinine.  Patient has had some chronic kidney disease but 1.7 is higher than previous.  Last creatinine I can find was 1.44 in [place].  Patient will be given a small amount of fluids as well as repletion of magnesium and Zofran and meclizine given for the disequilibrium and vertigo.  Additionally no leukocytosis and awaiting urinalysis but patient's hemoglobin was down from 13 back in March.  She does have history of gastric ulcers and per daughter reports she has " "complained about some stomach pains but again per chart review and daughter reports she has always complained about stomach pains.  Given the drop in hemoglobin I would recommend following the hemoglobin up and she is allergic to PPIs so we will give a dose of Pepcid.  No vomiting while in the ER but patient does hold onto an emesis bag and when asking daughter about symptoms she mostly reports headache.  MRI pending at this time however given the hyponatremia and symptoms likely related to that I would plan for admission.  Will still await MRI for any acute or subacute findings and proceed with admission as appropriate.  MRI returned showing acute/subacute right temporal infarct.  This may be contributing to the left corner of the mouth drooping but I do not find any other acute facial or left-sided deficit.  Patient started on electrolyte repletion and I did give a small dose of pain medication for reported headache.  Additionally given the drop in hemoglobin from 13-11 since March and history of gastric ulcer I am concerned about anticoagulation with the stroke and possible bleeding ulcer.  Patient's daughter does report she has had this in the past and on review of her chart this has been noted.  I did load her with Pepcid but unable to get PPI due to allergies and will need to monitor hemoglobin with possible evaluation with GI but awaiting recommendations from stroke neuro.\"    PT, OT, REHAB, PHARM, and SURG Consults pending. Anticipate improvement and return home to the care of family. CM to follow-up on consults and assist with service coordination needs as indicated. Family to transport pt home at time of discharge.    Geovanny Willis RN      "

## 2024-08-19 NOTE — ED NOTES
Stroke patient with difficult swallowing per night nurse reports. Pt BG was 73 Potassium 2.9 sodium of 123.  Provider notified. Waiting for orders.  Will continue to monitor.

## 2024-08-19 NOTE — H&P
[facility] MEDICINE ADMISSION HISTORY AND PHYSICAL     Assessment & Plan       Milady Pollock is a 64 year old  female with history of DM2, essential hypertension, dyslipidemia, history of CVA, dementia, GERD, came with confusion, headache, difficulty walking for 2 days found to have moderate dehydration, acute kidney injury with creatinine 1.72, hyponatremia with sodium 123 and acute/subacute right temporal lobe infarct on MRI scan.  Stroke neurology will be notified from ED.  Admitted to the hospital for further evaluation and treatment    Acute CVA  History of CVA  MRI-acute/subacute right temporal lobe infarct without hemorrhagic conversion  Initiate stroke protocol  Loading dose of aspirin and Plavix  Plavix 75 mg daily for 3 weeks  Aspirin 81 mg daily indefinitely  Resume Crestor 20 mg daily  Telemetry monitoring  Echocardiogram  PT and OT evaluation  Speech evaluation  Stroke neurology consultation    Acute hyponatremia with sodium 123  Moderate dehydration  Acute kidney injury with creatinine 1.72  Status post 500 mL normal saline bolus in ED  Continue IV normal saline at 100 mL/h  Urine and serum osmolality, urine sodium, cortisol, TSH will be obtained for hyponatremia    Hypomagnesemia at 1.6  Status post 2 g IV magnesium in ED    Essential hypertension  Antihypertensives are on hold due to acute stroke    DM2, hemoglobin A1c 9.3  Diabetic diet and insulin sliding scale  Glipizide 5 mg daily  Linagliptin 5 mg daily    Dementia  Under 5 mg twice a day  Continue supportive care    Code full  DVT prophylaxis  Started on aspirin and Plavix per stroke order  Early ambulation and SCDs    Inpatient admission  Needs to stay in the hospital at least for 2 days for further evaluation and treatment      Clinically Significant Risk Factors Present on Admission         # Hyponatremia: Lowest Na = 123 mmol/L in last 2 days, will monitor as appropriate    # Hypomagnesemia: Lowest Mg = 1.6 mg/dL in  last 2 days, will replace as needed                         Chief Complaint Confusion, gait instability, left-sided facial droop     HISTORY     Milady Pollock is a 64 year old  female with DM2, essential hypertension, dyslipidemia, history of CVA, dementia, GERD, came with confusion, headache, difficulty walking for 2 days.  She had CVA 15 years ago.  Has been ambulating with cane.  She is not talking much normally.  Daughter noticed that she is lower than normal for last 1 week, gait instability for 2 weeks.  Has nausea.  Appetite is down.  She has mild left-sided facial droop.  Patient is a poor historian.  Denies chest pain, breathing difficulty, palpitation, abdominal pain, diarrhea, constipation or urinary symptoms. Found to have moderate dehydration, acute kidney injury with creatinine 1.72, hyponatremia with sodium 123 and acute/subacute right temporal lobe infarct on MRI scan.  Stroke neurology  notified from ED.  Admitted to the hospital for further evaluation and treatment.  Most of the history obtained from medical record and her daughter.  Spoke with ED physician.    Past Medical History     CVA, hypertension, dyslipidemia, DM2, GERD    Surgical History   No past surgical history on file.  Family History    Reviewed  and unknown  Social History        Recently moved from [place].  Lives with family  Allergies     Allergies   Allergen Reactions    Iodinated Contrast Media Shortness Of Breath and Unknown    Omeprazole Shortness Of Breath and Unknown     Prior to Admission Medications      None      Review of Systems     A 12 point comprehensive review of systems was negative except as noted above in HPI.    PHYSICAL EXAMINATION       Vitals      Temp:  [97.3  F (36.3  C)] 97.3  F (36.3  C)  Pulse:  [63-71] 63  Resp:  [16-26] 17  BP: (162-193)/() 169/87  SpO2:  [91 %-100 %] 91 %    Examination     GENERAL:  Alert, appears comfortable, in no acute distress, looks older than stated age   HEAD:   Normocephalic, without obvious abnormality, atraumatic   EYES:  PERRL, conjunctiva  clear,  EOM's intact   NOSE: Nares normal, mucosa normal, no drainage   THROAT: Lips, mucosa, gums normal, mouth moist   NECK: Supple, symmetrical, trachea midline   BACK:   Symmetric, no curvature, ROM normal   LUNGS:   Clear to auscultation bilaterally, no rales, rhonchi, or wheezing, symmetric chest rise on inhalation, respirations unlabored   CHEST WALL:  No tenderness or deformity   HEART:  Regular rate and rhythm, S1 and S2 normal, no murmur, rub, or gallop    ABDOMEN:   Soft, non-tender, bowel sounds active, no masses, no organomegaly, no rebound or guarding   EXTREMITIES: No  edema    SKIN: Dry to touch    NEURO: Alert, not completely oriented x 4, moves all four extremities freely, and lower extremity strength intact, mild left-sided facial droop   PSYCH: Cooperative, behavior is appropriate      Pertinent Lab   Most Recent 3 CBC's:  Recent Labs   Lab Test 08/19/24  0104   WBC 8.9   HGB 11.4*   MCV 83        Most Recent 3 BMP's:  Recent Labs   Lab Test 08/19/24  0104   *   POTASSIUM 3.6   CHLORIDE 81*   CO2 30*   BUN 23.6*   CR 1.72*   ANIONGAP 12   DUKE 8.6*   *     Mg 1.6  Troponin 29  Lipid panel 7/24--LDL 83, triglyceride 374, HDL 41  Hemoglobin A1c 9.3    Pertinent Radiology     Radiology Results:   HEAD MRI:   1.  Acute/subacute right temporal lobe infarct without hemorrhagic conversion.  2.  Chronic senescent and presumed microvascular ischemic changes, as above.  3.  Scattered foci of susceptibility artifact within the supratentorial brain within peripheral and central distributions, likely reflecting chronic microhemorrhage versus amyloid angiopathy (or a combination of both).     HEAD MRA:   1.  No significant stenosis, large vessel occlusion, aneurysm or high flow vascular malformation.     NECK MRA:  1.  No significant stenosis or dissection.    EKG Results: Sinus rhythm, heart rate  69/min    Total time spent 70 min  Nicolasa Pickering MD  [facility]  [facility]  [facility]   Phone: #[phone]

## 2024-08-19 NOTE — ED NOTES
Pt alert and oriented to self only. Has baseline dementia with ongoing forgetfulness. Will continue to monitor and intervene as needed. Denies chest pain and N/V. Reports some LAIRD with ambulation, but no SOB at rest. Tele- NSR. Pt currently has no IV access. MD notified. Consulted PICC to place IV. OCN to follow up with orders. Will continue to trend labs and intervene as needed. Family by bedside and assisting with communication. Report given to MARINE Garcia who will resume cares.

## 2024-08-20 ENCOUNTER — APPOINTMENT (OUTPATIENT)
Dept: PHYSICAL THERAPY | Facility: CLINIC | Age: 64
End: 2024-08-20
Payer: COMMERCIAL

## 2024-08-20 VITALS
SYSTOLIC BLOOD PRESSURE: 165 MMHG | HEIGHT: 55 IN | OXYGEN SATURATION: 97 % | HEART RATE: 71 BPM | TEMPERATURE: 98.3 F | WEIGHT: 111 LBS | DIASTOLIC BLOOD PRESSURE: 86 MMHG | RESPIRATION RATE: 16 BRPM | BODY MASS INDEX: 25.69 KG/M2

## 2024-08-20 LAB
GLUCOSE BLDC GLUCOMTR-MCNC: 116 MG/DL (ref 70–99)
MAGNESIUM SERPL-MCNC: 2 MG/DL (ref 1.7–2.3)
POTASSIUM SERPL-SCNC: 4.1 MMOL/L (ref 3.4–5.3)
SODIUM SERPL-SCNC: 132 MMOL/L (ref 135–145)

## 2024-08-20 PROCEDURE — 250N000013 HC RX MED GY IP 250 OP 250 PS 637: Performed by: INTERNAL MEDICINE

## 2024-08-20 PROCEDURE — 97530 THERAPEUTIC ACTIVITIES: CPT | Mod: GP

## 2024-08-20 PROCEDURE — 97110 THERAPEUTIC EXERCISES: CPT | Mod: GP

## 2024-08-20 PROCEDURE — 84295 ASSAY OF SERUM SODIUM: CPT | Performed by: INTERNAL MEDICINE

## 2024-08-20 PROCEDURE — 250N000011 HC RX IP 250 OP 636: Performed by: INTERNAL MEDICINE

## 2024-08-20 PROCEDURE — 97116 GAIT TRAINING THERAPY: CPT | Mod: GP

## 2024-08-20 PROCEDURE — 99239 HOSP IP/OBS DSCHRG MGMT >30: CPT | Performed by: INTERNAL MEDICINE

## 2024-08-20 PROCEDURE — 84132 ASSAY OF SERUM POTASSIUM: CPT | Performed by: INTERNAL MEDICINE

## 2024-08-20 PROCEDURE — 258N000003 HC RX IP 258 OP 636: Performed by: FAMILY MEDICINE

## 2024-08-20 PROCEDURE — 83735 ASSAY OF MAGNESIUM: CPT | Performed by: INTERNAL MEDICINE

## 2024-08-20 PROCEDURE — 36415 COLL VENOUS BLD VENIPUNCTURE: CPT | Performed by: INTERNAL MEDICINE

## 2024-08-20 RX ORDER — MAGNESIUM SULFATE HEPTAHYDRATE 40 MG/ML
2 INJECTION, SOLUTION INTRAVENOUS ONCE
Status: COMPLETED | OUTPATIENT
Start: 2024-08-20 | End: 2024-08-20

## 2024-08-20 RX ORDER — LISINOPRIL 40 MG/1
40 TABLET ORAL DAILY
Qty: 30 TABLET | Refills: 1 | Status: SHIPPED | OUTPATIENT
Start: 2024-08-20

## 2024-08-20 RX ADMIN — FAMOTIDINE 20 MG: 20 TABLET, FILM COATED ORAL at 10:02

## 2024-08-20 RX ADMIN — MAGNESIUM SULFATE HEPTAHYDRATE 2 G: 40 INJECTION, SOLUTION INTRAVENOUS at 09:59

## 2024-08-20 RX ADMIN — ASPIRIN 81 MG: 81 TABLET, COATED ORAL at 10:02

## 2024-08-20 RX ADMIN — SODIUM CHLORIDE: 9 INJECTION, SOLUTION INTRAVENOUS at 01:12

## 2024-08-20 RX ADMIN — METFORMIN HYDROCHLORIDE 1000 MG: 500 TABLET, FILM COATED ORAL at 10:03

## 2024-08-20 ASSESSMENT — ACTIVITIES OF DAILY LIVING (ADL)
ADLS_ACUITY_SCORE: 39

## 2024-08-20 NOTE — PROGRESS NOTES
Care Management Discharge Note    Discharge Date: 08/20/2024       Discharge Disposition: Home Care, Home    Discharge Services: Home Care    Discharge DME: None    Discharge Transportation: family or friend will provide    Private pay costs discussed: Not applicable    Does the patient's insurance plan have a 3 day qualifying hospital stay waiver?  No    PAS Confirmation Code:    Patient/family educated on Medicare website which has current facility and service quality ratings: no    Education Provided on the Discharge Plan: No  Persons Notified of Discharge Plans: pt and son  Patient/Family in Agreement with the Plan: unable to assess    Handoff Referral Completed: No    Additional Information:  Pt to discharge back to home with family. Pt has an order for home care, [facility] is looking for a home care agency.     Family is here and is going to transport pt back to home.     Matthew Alvarado RN

## 2024-08-20 NOTE — PROGRESS NOTES
Physical Therapy Discharge Summary    Reason for therapy discharge:    Discharged to home with home therapy.    Progress towards therapy goal(s). See goals on Care Plan in [facility] electronic health record for goal details.  Goals not met.  Barriers to achieving goals:   discharge from facility.    Therapy recommendation(s):    Continued therapy is recommended.  Rationale/Recommendations:  Home PT.

## 2024-08-20 NOTE — PROGRESS NOTES
Discharge AVS reviewed with patient and son. [facility]  services declined by son.  Questions answered and teachings acknowledged.  Belongings and paperwork sent with via son transport after IV magnesium infusion is completed (updated MARINE Crowley). Orthopedic Stoplight Tool acknowledged (N/A)

## 2024-08-20 NOTE — PLAN OF CARE
Disoriented to place and situation. VSS on RA. Denies pain, n/v/sob. NS infusing at 100 mL/hr. Tele: NSR. Son at bedside, attentive to pt. Call light within reach. Will continue to monitor.    Goal Outcome Evaluation:    Problem: Adult Inpatient Plan of Care  Goal: Optimal Comfort and Wellbeing  Outcome: Progressing     Problem: Risk for Delirium  Goal: Improved Behavioral Control  Outcome: Progressing  Intervention: Minimize Safety Risk  Recent Flowsheet Documentation  Taken 8/20/2024 0100 by Vickie Valle RN  Enhanced Safety Measures:   review medications for side effects with activity   room near unit station     Problem: Risk for Delirium  Goal: Improved Attention and Thought Clarity  Outcome: Progressing

## 2024-08-20 NOTE — DISCHARGE SUMMARY
"[facility]  Hospitalist Discharge Summary      Date of Admission:  8/19/2024  Date of Discharge:  8/20/2024  Discharging Provider: Segundo Cabello DO  Discharge Service: Hospitalist Service    Discharge Diagnoses   Acute ischemic cerebrovascular accident  Acute hyponatremia   Moderate dehydration  Acute kidney injury   Hypomagnesemia   Hypokalemia  Hypertensive urgency  Essential hypertension  Diabetes mellitus type 2  Dementia    Clinically Significant Risk Factors     # DMII: A1C = 9.9 % (Ref range: <5.7 %) within past 6 months  # Overweight: Estimated body mass index is 26.76 kg/m  as calculated from the following:    Height as of this encounter: 1.372 m (4' 6\").    Weight as of this encounter: 50.3 kg (111 lb).       Follow-ups Needed After Discharge   Follow-up Appointments     Follow-up and recommended labs and tests       Follow up with primary care provider, Catarina Hernandez, within 7 days for   hospital follow- up, hyponatremia, hypertension, CVA  The following   labs/tests are recommended: BMP.    Follow up with [facility] Stroke clinic,  6-8 weeks, will contact   for follow up time. for hospital follow- up.          Discharge Disposition   Discharged to home  Condition at discharge: Stable    Hospital Course   Milady Pollock is a 64 year old  female with history of DM2, essential hypertension, dyslipidemia, history of CVA, dementia, GERD, came with confusion, headache, difficulty walking for 2 days found to have moderate dehydration, acute kidney injury with creatinine 1.72, hyponatremia with sodium 123 and acute/subacute right temporal lobe infarct on MRI scan.  Neurology recommended aspirin and comorbidity optimization.  Echocardiogram was unremarkable. LDL was at goal.  Will need to follow with PCP for optimization of diabetes mellitus management.  Blood glucose levels were at goal during hospitalization.   Outpatient cardiac monitor to rule out atrial fibrillation. Sodium " levels and creatinine returned to normal with normal saline infusion.  Hydrochlorothiazide was discontinued at discharge.     Consultations This Hospital Stay   NEUROLOGY IP STROKE CONSULT  SPEECH LANGUAGE PATH ADULT IP CONSULT  PHARMACY IP CONSULT  PHARMACY IP CONSULT  PHARMACY IP CONSULT  PHYSICAL THERAPY ADULT IP CONSULT  OCCUPATIONAL THERAPY ADULT IP CONSULT  REHAB ADMISSIONS LIAISON IP CONSULT  CARE MANAGEMENT / SOCIAL WORK IP CONSULT  VASCULAR ACCESS ADULT IP CONSULT  SMOKING CESSATION PROGRAM IP CONSULT    Code Status   Full Code    Time Spent on this Encounter   I, Segundo Cabello DO, personally saw the patient today and spent greater than 30 minutes discharging this patient.       Segundo Cabello DO  [facility] 3 ICU  [address] [address]  Phone: [phone]  Fax: [phone]  ______________________________________________________________________    Physical Exam   Vital Signs: Temp: 98.3  F (36.8  C) Temp src: Oral BP: (!) 165/86 Pulse: 71   Resp: 16 SpO2: 97 % O2 Device: None (Room air)    Weight: 111 lbs 0 oz  GENERAL:  Alert, appears comfortable, in no acute distress, looks older than stated age   HEAD:  Normocephalic, without obvious abnormality, atraumatic   EYES:  PERRL, conjunctiva  clear,  EOM's intact   THROAT: Lips, mucosa, gums normal, mouth moist   NECK: Supple, symmetrical, trachea midline   LUNGS:   Clear to auscultation bilaterally, no rales, rhonchi, or wheezing, symmetric chest rise on inhalation, respirations unlabored   HEART:  Regular rate and rhythm, S1 and S2 normal, no murmur, rub, or gallop    ABDOMEN:   Soft, non-tender, bowel sounds active, no masses, no organomegaly, no rebound or guarding   EXTREMITIES: No  edema    SKIN: Dry to touch    NEURO: Alert, not completely oriented x 4, moves all four extremities freely, and lower extremity strength intact, mild left-sided facial droop   PSYCH: Cooperative, behavior is  appropriate           Primary Care Physician   Catarina Hernandez    Discharge Orders      Home Care Referral      Reason for your hospital stay    Right temporal CVA, severe hyponatremia.     Follow-up and recommended labs and tests     Follow up with primary care provider, Catarina Hernandez, within 7 days for hospital follow- up, hyponatremia, hypertension, CVA  The following labs/tests are recommended: BMP.    Follow up with [facility] Stroke clinic,  6-8 weeks, will contact for follow up time. for hospital follow- up.     Activity    Your activity upon discharge: activity as tolerated     Diet    Follow this diet upon discharge: Orders Placed This Encounter      Regular Diet Adult Thin Liquids (level 0)     Stroke Hospital Follow Up (for neurologist use only)    [facility] [place] will call you to coordinate care as prescribed by your provider. If you don t hear from a representative within 2 business days, please call [phone].       MEIR LEE MAIL OUT       Significant Results and Procedures   Most Recent 3 CBC's:  Recent Labs   Lab Test 08/19/24  0104   WBC 8.9   HGB 11.4*   MCV 83        Most Recent 3 BMP's:  Recent Labs   Lab Test 08/20/24  0843 08/20/24  0629 08/19/24  2053 08/19/24  1745 08/19/24  1723 08/19/24  1554 08/19/24  1303 08/19/24  0945 08/19/24  0648 08/19/24  0644 08/19/24  0524 08/19/24  0104   NA  --  132*  --  122*  --   --  126*  --  123*  --  122* 123*   POTASSIUM  --  4.1  --  4.5  --   --   --   --  2.9*  --  2.9* 3.6   CHLORIDE  --   --   --   --   --   --   --   --  84*  --  85* 81*   CO2  --   --   --   --   --   --   --   --  23  --  21* 30*   BUN  --   --   --   --   --   --   --   --  18.9  --  20.6 23.6*   CR  --   --   --   --   --   --   --   --  1.42*  --  1.41* 1.72*   ANIONGAP  --   --   --   --   --   --   --   --  16*  --  16* 12   DUKE  --   --   --   --   --   --   --   --  8.9  --  8.1* 8.6*   *  --  227*  --  238*   < >  --    < > 85   < > 68* 153*    < >  = values in this interval not displayed.     Most Recent 2 LFT's:No lab results found.  7-Day Micro Results       No results found for the last 168 hours.          Most Recent Hemoglobin A1c:  Recent Labs   Lab Test 08/19/24  0104   A1C 9.9*     Most Recent 6 glucoses:  Recent Labs   Lab Test 08/20/24  0843 08/19/24  2053 08/19/24  1723 08/19/24  1554 08/19/24  0945 08/19/24  0648   * 227* 238* 216* 76 85   ,   Results for orders placed or performed during the hospital encounter of 08/19/24   MR Brain w/o & w Contrast    Narrative    EXAM: MR BRAIN W/O and W CONTRAST, MRA NECK (CAROTIDS) W/O and W CONTRAST, MRA BRAIN ([place] OF [place]) W/O CONTRAST  LOCATION: [facility]  DATE: 8/19/2024    INDICATION: Two days of disequilibrium and headache, history of dementia.  COMPARISON: None.  CONTRAST: 5 mL Gadavist  TECHNIQUE:   1) Routine multiplanar multisequence head MRI without and with intravenous contrast.  2) 3D time-of-flight head MRA without intravenous contrast.  3) Neck MRA without and with IV contrast. Stenosis measurements made according to NASCET criteria unless otherwise specified.    FINDINGS:  HEAD MRI:  INTRACRANIAL CONTENTS: There is a small curvilinear focus of diffusion restriction identified within the right temporal lobe measuring approximately 1.2 x 0.5 cm in greatest axial dimensions. There is no associated hemorrhagic conversion, however there   is mild associated T2/FLAIR signal hyperintensity in this region. No mass, acute hemorrhage or abnormal extra-axial fluid collection. There are numerous scattered foci of susceptibility artifact within the supratentorial brain in a central and peripheral   distribution, likely reflecting chronic microhemorrhage (such as in the setting of chronic hypertension) versus amyloid angiopathy. Patchy and confluent nonspecific T2/FLAIR hyperintensities within the cerebral white matter most consistent with moderate   chronic  microvascular ischemic change. Mild generalized cerebral atrophy. No hydrocephalus. Normal position of the cerebellar tonsils. No pathologic contrast enhancement.    SELLA: No abnormality accounting for technique.    OSSEOUS STRUCTURES/SOFT TISSUES: Normal marrow signal. The major intracranial vascular flow voids are maintained.     ORBITS: Prior left cataract surgery. Visualized portions of the orbits are otherwise unremarkable.     SINUSES/MASTOIDS: Mild mucosal thickening scattered about the paranasal sinuses. No middle ear or mastoid effusion.     HEAD MRA:   ANTERIOR CIRCULATION: No stenosis/occlusion, aneurysm, or high flow vascular malformation. Standard [place] of [place] anatomy.    POSTERIOR CIRCULATION: No stenosis/occlusion, aneurysm, or high flow vascular malformation. Dominant left and smaller right vertebral artery contribute to a normal basilar artery.     NECK MRA:   RIGHT CAROTID: No measurable stenosis or dissection.    LEFT CAROTID: No measurable stenosis or dissection.    VERTEBRAL ARTERIES: No focal stenosis or dissection. Dominant left and smaller right vertebral arteries.    AORTIC ARCH: Classic aortic arch anatomy with no significant stenosis at the origin of the great vessels.      Impression    IMPRESSION:  HEAD MRI:   1.  Acute/subacute right temporal lobe infarct without hemorrhagic conversion.  2.  Chronic senescent and presumed microvascular ischemic changes, as above.  3.  Scattered foci of susceptibility artifact within the supratentorial brain within peripheral and central distributions, likely reflecting chronic microhemorrhage versus amyloid angiopathy (or a combination of both).    HEAD MRA:   1.  No significant stenosis, large vessel occlusion, aneurysm or high flow vascular malformation.    NECK MRA:  1.  No significant stenosis or dissection.            Dr. Giancarlo Chavez discussed results with Dr. Mclean on 8/19/2024 at 4:36 AM CDT.     MRA Angiogram Head w/o Contrast     Narrative    EXAM: MR BRAIN W/O and W CONTRAST, MRA NECK (CAROTIDS) W/O and W CONTRAST, MRA BRAIN ([place] OF [place]) W/O CONTRAST  LOCATION: [facility]  DATE: 8/19/2024    INDICATION: Two days of disequilibrium and headache, history of dementia.  COMPARISON: None.  CONTRAST: 5 mL Gadavist  TECHNIQUE:   1) Routine multiplanar multisequence head MRI without and with intravenous contrast.  2) 3D time-of-flight head MRA without intravenous contrast.  3) Neck MRA without and with IV contrast. Stenosis measurements made according to NASCET criteria unless otherwise specified.    FINDINGS:  HEAD MRI:  INTRACRANIAL CONTENTS: There is a small curvilinear focus of diffusion restriction identified within the right temporal lobe measuring approximately 1.2 x 0.5 cm in greatest axial dimensions. There is no associated hemorrhagic conversion, however there   is mild associated T2/FLAIR signal hyperintensity in this region. No mass, acute hemorrhage or abnormal extra-axial fluid collection. There are numerous scattered foci of susceptibility artifact within the supratentorial brain in a central and peripheral   distribution, likely reflecting chronic microhemorrhage (such as in the setting of chronic hypertension) versus amyloid angiopathy. Patchy and confluent nonspecific T2/FLAIR hyperintensities within the cerebral white matter most consistent with moderate   chronic microvascular ischemic change. Mild generalized cerebral atrophy. No hydrocephalus. Normal position of the cerebellar tonsils. No pathologic contrast enhancement.    SELLA: No abnormality accounting for technique.    OSSEOUS STRUCTURES/SOFT TISSUES: Normal marrow signal. The major intracranial vascular flow voids are maintained.     ORBITS: Prior left cataract surgery. Visualized portions of the orbits are otherwise unremarkable.     SINUSES/MASTOIDS: Mild mucosal thickening scattered about the paranasal sinuses. No middle ear or mastoid  effusion.     HEAD MRA:   ANTERIOR CIRCULATION: No stenosis/occlusion, aneurysm, or high flow vascular malformation. Standard [place] of [place] anatomy.    POSTERIOR CIRCULATION: No stenosis/occlusion, aneurysm, or high flow vascular malformation. Dominant left and smaller right vertebral artery contribute to a normal basilar artery.     NECK MRA:   RIGHT CAROTID: No measurable stenosis or dissection.    LEFT CAROTID: No measurable stenosis or dissection.    VERTEBRAL ARTERIES: No focal stenosis or dissection. Dominant left and smaller right vertebral arteries.    AORTIC ARCH: Classic aortic arch anatomy with no significant stenosis at the origin of the great vessels.      Impression    IMPRESSION:  HEAD MRI:   1.  Acute/subacute right temporal lobe infarct without hemorrhagic conversion.  2.  Chronic senescent and presumed microvascular ischemic changes, as above.  3.  Scattered foci of susceptibility artifact within the supratentorial brain within peripheral and central distributions, likely reflecting chronic microhemorrhage versus amyloid angiopathy (or a combination of both).    HEAD MRA:   1.  No significant stenosis, large vessel occlusion, aneurysm or high flow vascular malformation.    NECK MRA:  1.  No significant stenosis or dissection.            Dr. Giancarlo Chavez discussed results with Dr. Mclean on 8/19/2024 at 4:36 AM CDT.     MRA Angiogram Neck w/o & w Contrast    Narrative    EXAM: MR BRAIN W/O and W CONTRAST, MRA NECK (CAROTIDS) W/O and W CONTRAST, MRA BRAIN ([place] OF [place]) W/O CONTRAST  LOCATION: [facility]  DATE: 8/19/2024    INDICATION: Two days of disequilibrium and headache, history of dementia.  COMPARISON: None.  CONTRAST: 5 mL Gadavist  TECHNIQUE:   1) Routine multiplanar multisequence head MRI without and with intravenous contrast.  2) 3D time-of-flight head MRA without intravenous contrast.  3) Neck MRA without and with IV contrast. Stenosis measurements made  according to NASCET criteria unless otherwise specified.    FINDINGS:  HEAD MRI:  INTRACRANIAL CONTENTS: There is a small curvilinear focus of diffusion restriction identified within the right temporal lobe measuring approximately 1.2 x 0.5 cm in greatest axial dimensions. There is no associated hemorrhagic conversion, however there   is mild associated T2/FLAIR signal hyperintensity in this region. No mass, acute hemorrhage or abnormal extra-axial fluid collection. There are numerous scattered foci of susceptibility artifact within the supratentorial brain in a central and peripheral   distribution, likely reflecting chronic microhemorrhage (such as in the setting of chronic hypertension) versus amyloid angiopathy. Patchy and confluent nonspecific T2/FLAIR hyperintensities within the cerebral white matter most consistent with moderate   chronic microvascular ischemic change. Mild generalized cerebral atrophy. No hydrocephalus. Normal position of the cerebellar tonsils. No pathologic contrast enhancement.    SELLA: No abnormality accounting for technique.    OSSEOUS STRUCTURES/SOFT TISSUES: Normal marrow signal. The major intracranial vascular flow voids are maintained.     ORBITS: Prior left cataract surgery. Visualized portions of the orbits are otherwise unremarkable.     SINUSES/MASTOIDS: Mild mucosal thickening scattered about the paranasal sinuses. No middle ear or mastoid effusion.     HEAD MRA:   ANTERIOR CIRCULATION: No stenosis/occlusion, aneurysm, or high flow vascular malformation. Standard [place] of [place] anatomy.    POSTERIOR CIRCULATION: No stenosis/occlusion, aneurysm, or high flow vascular malformation. Dominant left and smaller right vertebral artery contribute to a normal basilar artery.     NECK MRA:   RIGHT CAROTID: No measurable stenosis or dissection.    LEFT CAROTID: No measurable stenosis or dissection.    VERTEBRAL ARTERIES: No focal stenosis or dissection. Dominant left and smaller right  vertebral arteries.    AORTIC ARCH: Classic aortic arch anatomy with no significant stenosis at the origin of the great vessels.      Impression    IMPRESSION:  HEAD MRI:   1.  Acute/subacute right temporal lobe infarct without hemorrhagic conversion.  2.  Chronic senescent and presumed microvascular ischemic changes, as above.  3.  Scattered foci of susceptibility artifact within the supratentorial brain within peripheral and central distributions, likely reflecting chronic microhemorrhage versus amyloid angiopathy (or a combination of both).    HEAD MRA:   1.  No significant stenosis, large vessel occlusion, aneurysm or high flow vascular malformation.    NECK MRA:  1.  No significant stenosis or dissection.            Dr. Giancarlo Chavez discussed results with Dr. Mclean on 2024 at 4:36 AM CDT.     Echocardiogram Complete     Value    LVEF  > 65%    Narrative    [id number]  [id number]  [id number]  [id number]^JOANNE^[place] [address]     Name: GLADYS BEE  MRN: [id number]  : 1960  Study Date: 2024 08:25 AM  Age: 64 yrs  Gender: Female  Patient Location: [facility]  Reason For Study: CVA  Ordering Physician: MADONNA RUBIO  Performed By: AMY     BSA: 1.4 m2  Height: 58 in  Weight: 111 lb  HR: 71  BP: 159/77 mmHg  ______________________________________________________________________________  Procedure  Complete Portable Echo Adult. Definity (NDC #08861-556) given intravenously.  Complete Portable Bubble Echo Adult.  ______________________________________________________________________________  Interpretation Summary     Left ventricular size, wall motion and function are normal. The ejection  fraction is > 65%.  Normal right ventricle size and systolic function.  The left atrium is borderline dilated.  A contrast injection (Bubble Study) was performed that was negative for flow  across the interatrial septum.  No hemodynamically significant valvular  abnormalities on 2D or color flow  imaging.  ______________________________________________________________________________  Left Ventricle  Left ventricular size, wall motion and function are normal. The ejection  fraction is > 65%. There is mild concentric left ventricular hypertrophy. Left  ventricular diastolic function is normal. No regional wall motion  abnormalities noted. No evidence of left ventricular mass or tumors.     Right Ventricle  Normal right ventricle size and systolic function.     Atria  The left atrium is borderline dilated. Right atrial size is normal. There is  no color Doppler evidence of an atrial shunt. A contrast injection (Bubble  Study) was performed that was negative for flow across the interatrial septum.     Mitral Valve  Mitral valve leaflets appear normal. There is no evidence of mitral stenosis  or clinically significant mitral regurgitation.     Tricuspid Valve  Tricuspid valve leaflets appear normal. There is no evidence of tricuspid  stenosis or clinically significant tricuspid regurgitation.     Aortic Valve  There is mild trileaflet aortic sclerosis. There is trace to mild aortic  regurgitation.     Pulmonic Valve  The pulmonic valve is not well seen, but is grossly normal. This degree of  valvular regurgitation is within normal limits.     Vessels  Ascending Aorta dilatation is present. IVC diameter and respiratory changes  fall into an intermediate range suggesting an RA pressure of 8 mmHg.     Pericardium  There is no pericardial effusion.     ______________________________________________________________________________  MMode/2D Measurements & Calculations  IVSd: 1.5 cm  LVIDd: 3.7 cm  LVIDs: 2.4 cm  LVPWd: 1.1 cm  FS: 34.1 %     LV mass(C)d: 165.6 grams  LV mass(C)dI: 116.8 grams/m2  Ao root diam: 3.4 cm  asc Aorta Diam: 4.3 cm  LVOT diam: 2.1 cm  LVOT area: 3.5 cm2  Ao root diam index Ht(cm/m): 2.3  Ao root diam index BSA (cm/m2): 2.4  Asc Ao diam index BSA (cm/m2):  3.0  Asc Ao diam index Ht(cm/m): 2.9  EF Biplane: 63.9 %  LA Volume (BP): 36.1 ml     LA Volume Index (BP): 25.4 ml/m2  LA Volume Indexed (AL/bp): 29.8 ml/m2  RV Base: 3.1 cm  RWT: 0.61  TAPSE: 2.3 cm     Time Measurements  MM HR: 72.0 BPM     Doppler Measurements & Calculations  MV E max gil: 59.6 cm/sec  MV A max gil: 96.3 cm/sec  MV E/A: 0.62  MV dec slope: 221.4 cm/sec2  MV dec time: 0.27 sec  Ao V2 max: 121.6 cm/sec  Ao max P.0 mmHg  Ao V2 mean: 87.0 cm/sec  Ao mean PG: 3.3 mmHg  Ao V2 VTI: 21.8 cm  LETA(I,D): 2.1 cm2  LETA(V,D): 2.2 cm2  LV V1 max P.3 mmHg  LV V1 max: 75.8 cm/sec  LV V1 VTI: 12.9 cm  SV(LVOT): 45.3 ml  SI(LVOT): 31.9 ml/m2  PA acc time: 0.10 sec  AV Gil Ratio (DI): 0.62  LETA Index (cm2/m2): 1.5  E/E': 13.2  E/E' av.8  Lateral E/e': 10.4  Medial E/e': 13.2  Peak E' Gil: 4.5 cm/sec  RV S Gil: 17.0 cm/sec     ______________________________________________________________________________  Report approved by: Papi Weaver 2024 10:06 AM             Discharge Medications   Current Discharge Medication List        START taking these medications    Details   lisinopril (ZESTRIL) 40 MG tablet Take 1 tablet (40 mg) by mouth daily  Qty: 30 tablet, Refills: 1    Associated Diagnoses: Cerebrovascular accident (CVA), unspecified mechanism (H); Essential hypertension, benign           CONTINUE these medications which have NOT CHANGED    Details   aspirin 81 MG EC tablet Take 81 mg by mouth daily      atorvastatin (LIPITOR) 40 MG tablet Take 40 mg by mouth daily      insulin degludec (TRESIBA) 100 UNIT/ML pen Inject 12 Units subcutaneously every 24 hours      metFORMIN (GLUCOPHAGE) 500 MG tablet Take 1,000 mg by mouth 2 times daily (with meals)      OPTIVE 0.5-0.9 % ophthalmic solution Place 1 drop into both eyes 3 times daily as needed for dry eyes           STOP taking these medications       losartan-hydrochlorothiazide (HYZAAR) 50-12.5 MG tablet Comments:   Reason for Stopping:              Allergies   Allergies   Allergen Reactions    Iodinated Contrast Media Shortness Of Breath and Unknown    Omeprazole Shortness Of Breath and Unknown

## 2024-08-22 ENCOUNTER — APPOINTMENT (OUTPATIENT)
Dept: INTERPRETER SERVICES | Facility: CLINIC | Age: 64
End: 2024-08-22
Payer: COMMERCIAL

## 2024-08-22 ENCOUNTER — PATIENT OUTREACH (OUTPATIENT)
Dept: CARE COORDINATION | Facility: CLINIC | Age: 64
End: 2024-08-22
Payer: COMMERCIAL

## 2024-08-22 NOTE — PROGRESS NOTES
[facility]:   [facility] Contact  [facility]/Spontaneouslymail     Clinical Data: Post-Discharge Outreach     Outreach attempted x 2.  Left message on patient's voicemail, providing [facility]'s central phone number of [phone] ([phone]) for questions/concerns and/or to schedule an appt with an [facility] provider, if they do not have a PCP.      Plan:  [facility] will do no further outreaches at this time.     HAILEY Montgomery (she/her/hers)  Social Work Clinic Care Coordinator   [facility]  [email or web address]  [phone]      *Connected Care Resource Team does NOT follow patient ongoing. Referrals are identified based on internal discharge reports and the outreach is to ensure patient has an understanding of their discharge instructions.

## 2024-09-16 ENCOUNTER — DOCUMENTATION ONLY (OUTPATIENT)
Dept: CARDIAC REHAB | Facility: CLINIC | Age: 64
End: 2024-09-16
Payer: COMMERCIAL

## 2024-09-16 DIAGNOSIS — R42 DIZZINESS AND GIDDINESS: Primary | ICD-10-CM

## 2024-09-19 PROCEDURE — 93248 EXT ECG>7D<15D REV&INTERPJ: CPT | Performed by: INTERNAL MEDICINE

## 2024-09-24 ENCOUNTER — TELEPHONE (OUTPATIENT)
Dept: NEUROLOGY | Facility: CLINIC | Age: 64
End: 2024-09-24
Payer: COMMERCIAL

## 2024-09-24 NOTE — TELEPHONE ENCOUNTER
----- Message from Taya Herring sent at 9/20/2024  8:37 AM CDT -----  Per cardiology no report of atrial fibrillation.     Wen,   Can you please call patient and update her regarding results? Will need [facility]  to relay results.     Thank you,  Taya WOODS

## 2024-09-24 NOTE — TELEPHONE ENCOUNTER
Attempted number listed for pt and it was invalid. Tried number listed for daughter and reached her on the first attempt. Had hmong  on the line, but daughter spoke english and was agreeable to receiving the results in english. Explained the ziopatch was negative for atrial fibrillation and that there were no other concerning findings that warrant additional follow-up. She voiced understanding and did not have additional stroke related questions at this time. She did ask that a copy of the results be forwarded to the pt's [facility] PCP Dr. Hernandez.     Summary and result reports have been faxed to Dr. Hernandez at [facility] at F: [phone].       Wen Douglass BS, RN, SCRN  RN Stroke Neurology Care Coordinator  [facility] Neuroscience Service Line